# Patient Record
Sex: FEMALE | Race: BLACK OR AFRICAN AMERICAN | NOT HISPANIC OR LATINO | Employment: FULL TIME | URBAN - METROPOLITAN AREA
[De-identification: names, ages, dates, MRNs, and addresses within clinical notes are randomized per-mention and may not be internally consistent; named-entity substitution may affect disease eponyms.]

---

## 2023-06-30 ENCOUNTER — OFFICE VISIT (OUTPATIENT)
Dept: PRIMARY CARE | Facility: CLINIC | Age: 33
End: 2023-06-30
Payer: COMMERCIAL

## 2023-06-30 VITALS
BODY MASS INDEX: 29.41 KG/M2 | WEIGHT: 176.5 LBS | DIASTOLIC BLOOD PRESSURE: 66 MMHG | OXYGEN SATURATION: 99 % | HEIGHT: 65 IN | TEMPERATURE: 97.1 F | HEART RATE: 69 BPM | SYSTOLIC BLOOD PRESSURE: 96 MMHG

## 2023-06-30 DIAGNOSIS — L30.9 DERMATITIS: Primary | ICD-10-CM

## 2023-06-30 PROCEDURE — 1036F TOBACCO NON-USER: CPT | Performed by: STUDENT IN AN ORGANIZED HEALTH CARE EDUCATION/TRAINING PROGRAM

## 2023-06-30 PROCEDURE — 99213 OFFICE O/P EST LOW 20 MIN: CPT | Performed by: STUDENT IN AN ORGANIZED HEALTH CARE EDUCATION/TRAINING PROGRAM

## 2023-06-30 RX ORDER — PANTOPRAZOLE SODIUM 40 MG/1
TABLET, DELAYED RELEASE ORAL
COMMUNITY
Start: 2023-06-24

## 2023-06-30 RX ORDER — TRIAMCINOLONE ACETONIDE 1 MG/G
OINTMENT TOPICAL 2 TIMES DAILY PRN
Qty: 15 G | Refills: 0 | Status: SHIPPED | OUTPATIENT
Start: 2023-06-30 | End: 2023-10-28

## 2023-06-30 RX ORDER — ALBUTEROL SULFATE 90 UG/1
2 AEROSOL, METERED RESPIRATORY (INHALATION) EVERY 4 HOURS PRN
COMMUNITY
Start: 2015-02-11

## 2023-06-30 RX ORDER — TRIAMCINOLONE ACETONIDE 1 MG/G
OINTMENT TOPICAL 2 TIMES DAILY PRN
Qty: 15 G | Refills: 0 | Status: SHIPPED | OUTPATIENT
Start: 2023-06-30 | End: 2023-06-30 | Stop reason: SDUPTHER

## 2023-06-30 RX ORDER — HYDROXYZINE HYDROCHLORIDE 25 MG/1
1 TABLET, FILM COATED ORAL EVERY 6 HOURS PRN
COMMUNITY
Start: 2023-06-16 | End: 2023-07-16

## 2023-06-30 ASSESSMENT — PAIN SCALES - GENERAL: PAINLEVEL: 0-NO PAIN

## 2023-06-30 NOTE — PROGRESS NOTES
"Subjective   Patient ID: Idania Andersen is a 32 y.o. female who presents for [facility] (Stomach issues).    HPI     32-year-old female with past medical history of asthma and eczema who presents to the [facility].  Today, she is concerned about a rash on her neck region.  Patient states that the rash started about 3 weeks ago and she went to another hospital and was prescribed prednisone and hydroxyzine.  It is associated with pruritus, and erythema.  Patient reports having similar rash underneath her right breast which has resolved after taking some sort of antifungal cream.  Patient is followed by dermatology and uses a special compound cream (HYDROCORTISONE USP 2.5% / HYDROQUINONE USP 6% / TRETINOIN USP 0.05%.) for her eczema.  Patient reports doing some blood work around the time of her symptom onset.    Review of Systems    ROS:    - CONSTITUTIONAL: Denies weight loss, fever and chills.    - HEENT: Denies changes in vision and hearing.    - RESPIRATORY: Denies SOB and cough.    - CV: Denies palpitations and CP.    - GI: Denies abdominal pain, nausea, vomiting and diarrhea.    - : Denies dysuria and urinary frequency.    - MSK: Denies myalgia and joint pain.    - SKIN: Denies rash and pruritus.    - NEUROLOGICAL: Denies headache and syncope.    - PSYCHIATRIC: Denies recent changes in mood. Denies anxiety and depression.    A 12-point review of systems was completed and is otherwise negative except as noted in the HPI.      Objective   BP 96/66 (BP Location: Left arm, Patient Position: Sitting, BP Cuff Size: Adult long)   Pulse 69   Temp 36.2 °C (97.1 °F) (Temporal)   Ht 1.651 m (5' 5\")   Wt 80.1 kg (176 lb 8 oz)   SpO2 99%   BMI 29.37 kg/m²     Physical Exam    PHYSICAL EXAM:    - GENERAL: Alert and oriented x 3. No acute distress. Well-nourished.    - EYES: EOMI. Anicteric.    - HENT: Moist mucous membranes. No scleral icterus. No cervical lymphadenopathy.    - LUNGS: Clear to auscultation " bilaterally. No accessory muscle use.    - CARDIOVASCULAR: Regular rate and rhythm. No murmur. No JVD.    - ABDOMEN: Soft, non-tender and non-distended. No palpable masses.    - EXTREMITIES: No edema. Non-tender.    - SKIN: Erythematous slightly raised patch changes on the left neck region.  No excoriations or tenderness.    - NEUROLOGIC: No focal neurological deficits. CN II-XII grossly intact, but not individually tested.    - PSYCHIATRIC: Cooperative. Appropriate mood and affect.     Assessment/Plan       #Skin dermatitis, likely irritant vs eczematous   -Rx triamcinolone  -Skin hydration with emollients  -Skin hygiene with mild soap  -Continue antihistamines for pruritus  -Return to clinic for follow-up if symptoms worsens or does not improve.    Patient seen and discussed with attending physician Dr. Tod Croft MD  Family Medicine, PGY-2    This note was completed using Dragon voice recognition technology and may include unintended errors with respect to translation of words, typographical errors or grammar errors which may not have been identified while finalizing the chart.

## 2023-07-12 NOTE — PROGRESS NOTES
I saw and evaluated the patient. I personally obtained the key and critical portions of the history and physical exam or was physically present for key and critical portions performed by the resident/fellow. I reviewed the resident/fellow's documentation and discussed the patient with the resident/fellow. I agree with the resident/fellow's medical decision making as documented in the note.    Art Baron MD

## 2024-07-09 ENCOUNTER — APPOINTMENT (OUTPATIENT)
Dept: OBSTETRICS AND GYNECOLOGY | Facility: CLINIC | Age: 34
End: 2024-07-09
Payer: COMMERCIAL

## 2024-07-10 ENCOUNTER — OFFICE VISIT (OUTPATIENT)
Dept: OBSTETRICS AND GYNECOLOGY | Facility: CLINIC | Age: 34
End: 2024-07-10
Payer: COMMERCIAL

## 2024-07-10 VITALS
WEIGHT: 182 LBS | SYSTOLIC BLOOD PRESSURE: 118 MMHG | BODY MASS INDEX: 30.32 KG/M2 | HEIGHT: 65 IN | DIASTOLIC BLOOD PRESSURE: 68 MMHG

## 2024-07-10 DIAGNOSIS — N89.8 VAGINAL LESION: ICD-10-CM

## 2024-07-10 DIAGNOSIS — N89.8 VAGINAL IRRITATION: Primary | ICD-10-CM

## 2024-07-10 PROCEDURE — 87529 HSV DNA AMP PROBE: CPT

## 2024-07-10 PROCEDURE — 99213 OFFICE O/P EST LOW 20 MIN: CPT | Performed by: ADVANCED PRACTICE MIDWIFE

## 2024-07-10 RX ORDER — CHOLECALCIFEROL (VITAMIN D3) 50 MCG
TABLET ORAL
COMMUNITY

## 2024-07-10 RX ORDER — MAGNESIUM GLUCONATE 27 MG(500)
27 TABLET ORAL 2 TIMES DAILY
COMMUNITY

## 2024-07-10 RX ORDER — POLYDEXTROSE 1.5 G
1 TABLET,CHEWABLE ORAL
COMMUNITY

## 2024-07-10 NOTE — PROGRESS NOTES
"Subjective   Patient ID: Idania Andersen is a 33 y.o. female who presents for Vaginitis/Bacterial Vaginosis (Vaginal Irritation///Chaperone Declined: KENISHA Torres/).  HPI  Pt. Here for vaginal irritation  Mostly external   Felt like vulva was \"on fire\" yesterday     Onset over the weekend  Used a dial soap and believes that's what caused it     +itching  +burning   Denies any odor    Sexually active 2 weeks ago   On/off partner  No condom   Used Plan B   LMP 6/19     Denies urinary issues  Pain with wiping     Hx of trichomonas     Currently on Day 4 of Monistat    Review of Systems    Objective   Physical Exam  Constitutional:       Appearance: Normal appearance.   Genitourinary:     Vagina: Vaginal discharge present.          Comments: Small cut like appearance on vulva     Labia majora WNL  Some thick white discharge surrounding introitus, no odor appreciated      Neurological:      Mental Status: She is alert.         Assessment/Plan   Problem List Items Addressed This Visit             ICD-10-CM       Medium    Vaginal irritation - Primary N89.8     Discussed hygiene  Finish course of Monistat, unable to do vaginitis swab with medication in vagina.   Has upcoming appointment with Dr. Covington, will assess further if needed at that time.          Vaginal lesion N89.8     HSV swab obtained          Relevant Orders    HSV PCR, Skin/Mucosa            CLAUDIA Garcia 07/10/24 2:10 PM   "

## 2024-07-11 PROBLEM — N89.8 VAGINAL IRRITATION: Status: ACTIVE | Noted: 2024-07-11

## 2024-07-11 PROBLEM — N89.8 VAGINAL LESION: Status: ACTIVE | Noted: 2024-07-11

## 2024-07-11 LAB
HSV1 DNA SKIN QL NAA+PROBE: NOT DETECTED
HSV2 DNA SKIN QL NAA+PROBE: NOT DETECTED

## 2024-07-11 NOTE — ASSESSMENT & PLAN NOTE
Discussed hygiene  Finish course of Monistat, unable to do vaginitis swab with medication in vagina.   Has upcoming appointment with Dr. Covington, will assess further if needed at that time.

## 2024-07-14 ASSESSMENT — ENCOUNTER SYMPTOMS
CONSTIPATION: 1
NAUSEA: 0
DIARRHEA: 0
BACK PAIN: 0
FREQUENCY: 0
HEMATURIA: 0
CHILLS: 0
ABDOMINAL PAIN: 0
FEVER: 0
HEADACHES: 0
VOMITING: 0
SORE THROAT: 0
ANOREXIA: 0
DYSURIA: 0
FLANK PAIN: 0

## 2024-07-18 ENCOUNTER — APPOINTMENT (OUTPATIENT)
Dept: OBSTETRICS AND GYNECOLOGY | Facility: CLINIC | Age: 34
End: 2024-07-18
Payer: COMMERCIAL

## 2024-07-18 VITALS
SYSTOLIC BLOOD PRESSURE: 117 MMHG | BODY MASS INDEX: 30.69 KG/M2 | WEIGHT: 184.2 LBS | DIASTOLIC BLOOD PRESSURE: 74 MMHG | HEIGHT: 65 IN

## 2024-07-18 DIAGNOSIS — Z30.09 STERILIZATION CONSULT: ICD-10-CM

## 2024-07-18 DIAGNOSIS — Z33.1 PREGNANT STATE, INCIDENTAL (HHS-HCC): Primary | ICD-10-CM

## 2024-07-18 LAB — PREGNANCY TEST URINE, POC: POSITIVE

## 2024-07-18 PROCEDURE — 3008F BODY MASS INDEX DOCD: CPT | Performed by: STUDENT IN AN ORGANIZED HEALTH CARE EDUCATION/TRAINING PROGRAM

## 2024-07-18 PROCEDURE — 81025 URINE PREGNANCY TEST: CPT | Performed by: STUDENT IN AN ORGANIZED HEALTH CARE EDUCATION/TRAINING PROGRAM

## 2024-07-18 PROCEDURE — 99213 OFFICE O/P EST LOW 20 MIN: CPT | Performed by: STUDENT IN AN ORGANIZED HEALTH CARE EDUCATION/TRAINING PROGRAM

## 2024-07-18 ASSESSMENT — PATIENT HEALTH QUESTIONNAIRE - PHQ9
2. FEELING DOWN, DEPRESSED OR HOPELESS: NOT AT ALL
1. LITTLE INTEREST OR PLEASURE IN DOING THINGS: NOT AT ALL
SUM OF ALL RESPONSES TO PHQ9 QUESTIONS 1 AND 2: 0

## 2024-07-18 ASSESSMENT — PAIN SCALES - GENERAL: PAINLEVEL: 0-NO PAIN

## 2024-07-23 ENCOUNTER — HOSPITAL ENCOUNTER (EMERGENCY)
Facility: HOSPITAL | Age: 34
Discharge: HOME | End: 2024-07-24
Attending: EMERGENCY MEDICINE
Payer: COMMERCIAL

## 2024-07-23 DIAGNOSIS — O00.202: Primary | ICD-10-CM

## 2024-07-23 LAB
ALBUMIN SERPL BCP-MCNC: 4.3 G/DL (ref 3.4–5)
ALP SERPL-CCNC: 40 U/L (ref 33–110)
ALT SERPL W P-5'-P-CCNC: 11 U/L (ref 7–45)
ANION GAP SERPL CALC-SCNC: 12 MMOL/L (ref 10–20)
APPEARANCE UR: CLEAR
AST SERPL W P-5'-P-CCNC: 15 U/L (ref 9–39)
BACTERIA #/AREA URNS AUTO: ABNORMAL /HPF
BILIRUB SERPL-MCNC: 0.5 MG/DL (ref 0–1.2)
BILIRUB UR STRIP.AUTO-MCNC: NEGATIVE MG/DL
BUN SERPL-MCNC: 13 MG/DL (ref 6–23)
CALCIUM SERPL-MCNC: 9.2 MG/DL (ref 8.6–10.3)
CHLORIDE SERPL-SCNC: 103 MMOL/L (ref 98–107)
CO2 SERPL-SCNC: 23 MMOL/L (ref 21–32)
COLOR UR: COLORLESS
CREAT SERPL-MCNC: 0.78 MG/DL (ref 0.5–1.05)
EGFRCR SERPLBLD CKD-EPI 2021: >90 ML/MIN/1.73M*2
ERYTHROCYTE [DISTWIDTH] IN BLOOD BY AUTOMATED COUNT: 13.1 % (ref 11.5–14.5)
GLUCOSE SERPL-MCNC: 95 MG/DL (ref 74–99)
GLUCOSE UR STRIP.AUTO-MCNC: NORMAL MG/DL
HCG UR QL IA.RAPID: POSITIVE
HCT VFR BLD AUTO: 34.4 % (ref 36–46)
HGB BLD-MCNC: 11.7 G/DL (ref 12–16)
KETONES UR STRIP.AUTO-MCNC: NEGATIVE MG/DL
LEUKOCYTE ESTERASE UR QL STRIP.AUTO: NEGATIVE
MCH RBC QN AUTO: 30.2 PG (ref 26–34)
MCHC RBC AUTO-ENTMCNC: 34 G/DL (ref 32–36)
MCV RBC AUTO: 89 FL (ref 80–100)
NITRITE UR QL STRIP.AUTO: NEGATIVE
NRBC BLD-RTO: 0 /100 WBCS (ref 0–0)
PH UR STRIP.AUTO: 6 [PH]
PLATELET # BLD AUTO: 376 X10*3/UL (ref 150–450)
POTASSIUM SERPL-SCNC: 3.8 MMOL/L (ref 3.5–5.3)
PROT SERPL-MCNC: 6.8 G/DL (ref 6.4–8.2)
PROT UR STRIP.AUTO-MCNC: NEGATIVE MG/DL
RBC # BLD AUTO: 3.87 X10*6/UL (ref 4–5.2)
RBC # UR STRIP.AUTO: ABNORMAL /UL
RBC #/AREA URNS AUTO: ABNORMAL /HPF
SODIUM SERPL-SCNC: 134 MMOL/L (ref 136–145)
SP GR UR STRIP.AUTO: 1
SQUAMOUS #/AREA URNS AUTO: ABNORMAL /HPF
UROBILINOGEN UR STRIP.AUTO-MCNC: NORMAL MG/DL
WBC # BLD AUTO: 8 X10*3/UL (ref 4.4–11.3)
WBC #/AREA URNS AUTO: ABNORMAL /HPF

## 2024-07-23 PROCEDURE — 81025 URINE PREGNANCY TEST: CPT | Performed by: EMERGENCY MEDICINE

## 2024-07-23 PROCEDURE — 99284 EMERGENCY DEPT VISIT MOD MDM: CPT | Mod: 25

## 2024-07-23 PROCEDURE — 85027 COMPLETE CBC AUTOMATED: CPT | Performed by: EMERGENCY MEDICINE

## 2024-07-23 PROCEDURE — 81001 URINALYSIS AUTO W/SCOPE: CPT | Performed by: EMERGENCY MEDICINE

## 2024-07-23 PROCEDURE — 80053 COMPREHEN METABOLIC PANEL: CPT | Performed by: EMERGENCY MEDICINE

## 2024-07-23 PROCEDURE — 84702 CHORIONIC GONADOTROPIN TEST: CPT | Performed by: EMERGENCY MEDICINE

## 2024-07-23 PROCEDURE — 36415 COLL VENOUS BLD VENIPUNCTURE: CPT | Performed by: EMERGENCY MEDICINE

## 2024-07-23 RX ORDER — ACETAMINOPHEN 325 MG/1
975 TABLET ORAL ONCE
Status: COMPLETED | OUTPATIENT
Start: 2024-07-23 | End: 2024-07-23

## 2024-07-23 ASSESSMENT — COLUMBIA-SUICIDE SEVERITY RATING SCALE - C-SSRS
1. IN THE PAST MONTH, HAVE YOU WISHED YOU WERE DEAD OR WISHED YOU COULD GO TO SLEEP AND NOT WAKE UP?: NO
2. HAVE YOU ACTUALLY HAD ANY THOUGHTS OF KILLING YOURSELF?: NO
6. HAVE YOU EVER DONE ANYTHING, STARTED TO DO ANYTHING, OR PREPARED TO DO ANYTHING TO END YOUR LIFE?: NO

## 2024-07-23 ASSESSMENT — PAIN - FUNCTIONAL ASSESSMENT: PAIN_FUNCTIONAL_ASSESSMENT: 0-10

## 2024-07-23 ASSESSMENT — PAIN SCALES - GENERAL
PAINLEVEL_OUTOF10: 10 - WORST POSSIBLE PAIN
PAINLEVEL_OUTOF10: 8

## 2024-07-24 ENCOUNTER — LAB (OUTPATIENT)
Dept: LAB | Facility: LAB | Age: 34
End: 2024-07-24
Payer: COMMERCIAL

## 2024-07-24 ENCOUNTER — APPOINTMENT (OUTPATIENT)
Dept: RADIOLOGY | Facility: HOSPITAL | Age: 34
End: 2024-07-24
Payer: COMMERCIAL

## 2024-07-24 ENCOUNTER — PREP FOR PROCEDURE (OUTPATIENT)
Dept: OBSTETRICS AND GYNECOLOGY | Facility: CLINIC | Age: 34
End: 2024-07-24

## 2024-07-24 VITALS
HEART RATE: 72 BPM | DIASTOLIC BLOOD PRESSURE: 47 MMHG | WEIGHT: 183 LBS | OXYGEN SATURATION: 98 % | BODY MASS INDEX: 31.24 KG/M2 | HEIGHT: 64 IN | SYSTOLIC BLOOD PRESSURE: 137 MMHG | RESPIRATION RATE: 16 BRPM | TEMPERATURE: 99 F

## 2024-07-24 DIAGNOSIS — O36.80X0 PREGNANCY OF UNKNOWN ANATOMIC LOCATION (HHS-HCC): Primary | ICD-10-CM

## 2024-07-24 DIAGNOSIS — O00.202: ICD-10-CM

## 2024-07-24 DIAGNOSIS — O36.80X0 PREGNANCY OF UNKNOWN ANATOMIC LOCATION (HHS-HCC): ICD-10-CM

## 2024-07-24 LAB
B-HCG SERPL-ACNC: 2705 MIU/ML
B-HCG SERPL-ACNC: 3085 MIU/ML
PROGEST SERPL-MCNC: 10.6 NG/ML

## 2024-07-24 PROCEDURE — 36415 COLL VENOUS BLD VENIPUNCTURE: CPT

## 2024-07-24 PROCEDURE — 96361 HYDRATE IV INFUSION ADD-ON: CPT

## 2024-07-24 PROCEDURE — 84702 CHORIONIC GONADOTROPIN TEST: CPT

## 2024-07-24 PROCEDURE — 2500000004 HC RX 250 GENERAL PHARMACY W/ HCPCS (ALT 636 FOR OP/ED): Performed by: EMERGENCY MEDICINE

## 2024-07-24 PROCEDURE — 99222 1ST HOSP IP/OBS MODERATE 55: CPT | Performed by: STUDENT IN AN ORGANIZED HEALTH CARE EDUCATION/TRAINING PROGRAM

## 2024-07-24 PROCEDURE — 76801 OB US < 14 WKS SINGLE FETUS: CPT

## 2024-07-24 PROCEDURE — 84144 ASSAY OF PROGESTERONE: CPT

## 2024-07-24 PROCEDURE — 96360 HYDRATION IV INFUSION INIT: CPT

## 2024-07-24 RX ORDER — GABAPENTIN 600 MG/1
600 TABLET ORAL ONCE
OUTPATIENT
Start: 2024-07-24 | End: 2024-07-24

## 2024-07-24 RX ORDER — ACETAMINOPHEN 325 MG/1
975 TABLET ORAL ONCE
OUTPATIENT
Start: 2024-07-24 | End: 2024-07-24

## 2024-07-24 RX ORDER — CELECOXIB 400 MG/1
400 CAPSULE ORAL ONCE
OUTPATIENT
Start: 2024-07-24 | End: 2024-07-24

## 2024-07-24 ASSESSMENT — PAIN SCALES - GENERAL: PAINLEVEL_OUTOF10: 0 - NO PAIN

## 2024-07-24 NOTE — ED PROVIDER NOTES
HPI   Chief Complaint   Patient presents with    Abdominal Pain    Vaginal Bleeding       HPI  Patient is G4,  at approximately 5 weeks by her LMP.  This is not a desired pregnancy.  The patient did want to take Plan B without success.  Patient states that she is having increasing left-sided abdominal pain in the lower quadrant.  Denies any vomiting with this.  States that she started having some vaginal spotting.  She is not having signs of bleeding.      Patient History   Past Medical History:   Diagnosis Date    Acute tonsillitis, unspecified 2014    Acute tonsillitis    Acute vaginitis 2015    BV (bacterial vaginosis)    Constipation, unspecified 2015    Acute constipation    Encounter for screening for malignant neoplasm of cervix 2018    Cervical cancer screening    Personal history of other diseases of the female genital tract 10/15/2015    History of vaginal discharge    Personal history of other diseases of the female genital tract 10/15/2015    History of vaginitis    Personal history of other diseases of the nervous system and sense organs 10/10/2014    History of acute otitis media    Personal history of other diseases of the respiratory system 2018    History of acute sinusitis    Personal history of other infectious and parasitic diseases 2015    History of candidiasis of vagina    Personal history of other specified conditions 2018    History of dysuria    Personal history of other specified conditions 2014    History of urinary frequency    Personal history of urinary (tract) infections 2018    History of urinary tract infection    Pregnant state, incidental ([facility]) 10/15/2015    Pregnancy, incidental    Unspecified asthma, uncomplicated ([facility]) 2017    Asthma     No past surgical history on file.  No family history on file.  Social History     Tobacco Use    Smoking status: Never     Passive exposure: Never    Smokeless tobacco:  Never   Vaping Use    Vaping status: Never Used   Substance Use Topics    Alcohol use: Never    Drug use: Never       Physical Exam   ED Triage Vitals   Temperature Heart Rate Respirations BP   07/23/24 2130 07/23/24 2130 07/23/24 2130 07/23/24 2130   37.2 °C (99 °F) 78 16 125/65      Pulse Ox Temp src Heart Rate Source Patient Position   07/23/24 2130 -- 07/23/24 2330 07/23/24 2330   100 %  Monitor Sitting      BP Location FiO2 (%)     07/23/24 2330 --     Right arm        Physical Exam  Vitals and nursing note reviewed.   Constitutional:       General: She is not in acute distress.     Appearance: She is well-developed.   HENT:      Head: Normocephalic and atraumatic.   Eyes:      Conjunctiva/sclera: Conjunctivae normal.   Cardiovascular:      Rate and Rhythm: Normal rate and regular rhythm.      Heart sounds: No murmur heard.  Pulmonary:      Effort: Pulmonary effort is normal. No respiratory distress.      Breath sounds: Normal breath sounds.   Abdominal:      Palpations: Abdomen is soft.      Tenderness: There is no abdominal tenderness.   Musculoskeletal:         General: No swelling.      Cervical back: Neck supple.   Skin:     General: Skin is warm and dry.      Capillary Refill: Capillary refill takes less than 2 seconds.   Neurological:      Mental Status: She is alert.   Psychiatric:         Mood and Affect: Mood normal.           ED Course & MDM   Diagnoses as of 07/24/24 0516   Left ovarian pregnancy without intrauterine pregnancy ([facility])                       Reyes Coma Scale Score: 15                        Medical Decision Making  The patient had an ultrasound performed which does not show any obvious IUP.  The patient's hCG at 2700 is below the level discrimination for intrauterine pregnancy.  However the patient is not interested in pregnancy at this time.  Spoke to gynecology for just a D&C and if negative for intrauterine pregnancy can consider treatment for an ectopic pregnancy at that time  either medications or further surgery.  Likely discharge from the PACU but otherwise would need to be admitted to medicine.    Procedure  Procedures     Rodrigo Ospina MD  07/24/24 0588

## 2024-07-24 NOTE — CONSULTS
Inpatient consult to Gynecology  Consult performed by: Neptali Covington MD  Consult ordered by: Rodrigo Ospina MD  Reason for consult: Suspected ectopic pregnancy  Assessment/Recommendations: History labs and imaging reviewed in detail.  Patient counseled at the bedside.  Ultrasound is suspicious for but not diagnostic of ectopic pregnancy.  There is no evidence of rupture on ultrasound.  Patient does not accept the risks of disrupting a potentially viable intrauterine pregnancy if present.  Patient is stable from a hemodynamic perspective and has a benign abdominal exam.  Using shared decision making plan is to trend beta-hCG levels and repeat ultrasound on Friday.  Patient will go to the lab this morning for an hCG level and then another hCG level Friday morning so that she does not have to return to the ED late Thursday night.  Strict return precautions were reviewed with the patient.  I will request that my office schedule follow-up for her on Friday afternoon.          Reason For Consult  Suspected ectopic pregnancy    History Of Present Illness  Idania Andersen is a 33 y.o. female presenting with pelvic pain and bleeding in the setting of a positive pregnancy test.  Workup in the ED showed an elevated beta-hCG with normal hemoglobin, white blood count, transaminases and creatinine level.  Ultrasound did not visualize an intrauterine pregnancy but did show an adnexal mass that is suspicious for but not diagnostic of ectopic pregnancy.  There is no free fluid in the pelvis gynecology was consulted.     Past Medical History  She has a past medical history of Acute tonsillitis, unspecified (05/20/2014), Acute vaginitis (05/27/2015), Constipation, unspecified (02/18/2015), Encounter for screening for malignant neoplasm of cervix (04/13/2018), Personal history of other diseases of the female genital tract (10/15/2015), Personal history of other diseases of the female genital tract (10/15/2015), Personal  "history of other diseases of the nervous system and sense organs (10/10/2014), Personal history of other diseases of the respiratory system (09/26/2018), Personal history of other infectious and parasitic diseases (06/26/2015), Personal history of other specified conditions (07/12/2018), Personal history of other specified conditions (05/20/2014), Personal history of urinary (tract) infections (07/12/2018), Pregnant state, incidental ([facility]) (10/15/2015), and Unspecified asthma, uncomplicated ([facility]) (09/11/2017).    Surgical History  She has no past surgical history on file.     Social History  She reports that she has never smoked. She has never been exposed to tobacco smoke. She has never used smokeless tobacco. She reports that she does not drink alcohol and does not use drugs.    Family History  No family history on file.     Allergies  Apricot, Cherry, Kiwi, Peach, and Plum    Review of Systems   Genitourinary:  Positive for pelvic pain and vaginal bleeding.        Physical Exam  Vitals reviewed.   Constitutional:       General: She is not in acute distress.     Appearance: She is not ill-appearing.   Pulmonary:      Effort: Pulmonary effort is normal.   Abdominal:      General: There is no distension.      Palpations: Abdomen is soft.      Tenderness: There is no abdominal tenderness. There is no guarding or rebound.   Skin:     Coloration: Skin is not pale.   Neurological:      Mental Status: She is alert.          Last Recorded Vitals  Blood pressure 134/69, pulse 74, temperature 37.2 °C (99 °F), resp. rate 16, height 1.626 m (5' 4\"), weight 83 kg (183 lb), last menstrual period 06/19/2024, SpO2 100%.    Relevant Results    US PELVIS OB TRANSABDOMINAL W TRANSVAGINAL UP TO 1ST TRIMESTER    Result Date: 7/24/2024  Interpreted By:  Ezekiel Hernandez, STUDY: US PELVIS OB TRANSABDOMINAL W TRANSVAGINAL UP TO 1ST TRIMESTER; ; 7/24/2024 1:08 am   INDICATION: Signs/Symptoms:r/o ectopic 5 wks unlikely to see IUP.  " Quantitative beta HCG value of 2,705.   COMPARISON: None.   ACCESSION NUMBER(S): [id number]   ORDERING CLINICIAN: ESTER CASTRO   TECHNIQUE: Grayscale, color and spectral Doppler ultrasound evaluation of the pelvis was performed transabdominally and transvaginally.   FINDINGS: UTERUS: Normal, measuring 9.4 x 4.9 x 5.2 cm. ENDOMETRIUM: The endometrium measures 10 mm in thickness. There is a small volume of fluid in the endometrial canal. No gestational sac or sac-like structure is present in the endometrial canal. RIGHT OVARY: Normal, measuring 2.6 x 1.9 x 1.8 cm, incidentally noting a corpus luteum. Normal arterial and venous flow present. LEFT OVARY: Left ovary appears normal and measures 2.4 x 1.7 x 1.7 cm. Normal arterial and venous flow present.There is an extra-ovarian cystic mass with peripheral hyperechoic rim in the left adnexa , with an internal thin-walled cystic structure suggesting a yolk sac, overall suggestive of an ectopic gestational sac. The mass in total measures 16 x 9 x 10 mm. CUL DE SAC: No free-fluid.       Cystic mass extra-ovarian left adnexal mass with suggestion of a yolk sac within it, overall suggestive of a left adnexal ectopic pregnancy. No significant pelvic fluid is seen to suggest rupture. Gynecology/obstetric consultation recommended.     MACRO: None   Signed by: Ezekiel Hernandez 7/24/2024 1:31 AM Dictation workstation:   [id number]            Assessment/Plan     History labs and imaging reviewed in detail.  Patient counseled at the bedside.  Ultrasound is suspicious for but not diagnostic of ectopic pregnancy.  There is no evidence of rupture on ultrasound.  Patient initially desired dilation and curettage with rapid frozen evaluation to determine whether or not this was an ectopic pregnancy and if ectopic methotrexate therapy.  However upon further counseling patient does not accept the risks of disrupting a potentially viable intrauterine pregnancy if present.  Patient is stable  from a hemodynamic perspective and has a benign abdominal exam.  Diagnosis remains pregnancy of unknown location cannot exclude ectopic pregnancy.  Using shared decision making plan is to trend beta-hCG levels and repeat ultrasound on Friday.  Patient will go to the lab this morning for an hCG level and then another hCG level Friday morning so that she does not have to return to the ED late Thursday night.  Strict return precautions were reviewed with the patient.  I will request that my office schedule follow-up for her on Friday afternoon.    I spent 60 minutes in the professional and overall care of this patient.

## 2024-07-24 NOTE — ED TRIAGE NOTES
Pt arrives to triage with c/o left sided abdominal cramping beginning yesterday and spotting beginning today. Pt is 5 weeks pregnant. The spotting began tinged and bright red and is gradually getting darker. Reports nausea, denies CP/SOB.

## 2024-07-24 NOTE — ED NOTES
The pt is 5 weeks pregnant and is cramping on there left side and bleeding. Her pain is radiating from the front to the back     Roverto Miguel RN  07/23/24 2474

## 2024-07-25 ENCOUNTER — HOSPITAL ENCOUNTER (OUTPATIENT)
Facility: HOSPITAL | Age: 34
Setting detail: OUTPATIENT SURGERY
End: 2024-07-25
Attending: STUDENT IN AN ORGANIZED HEALTH CARE EDUCATION/TRAINING PROGRAM | Admitting: STUDENT IN AN ORGANIZED HEALTH CARE EDUCATION/TRAINING PROGRAM
Payer: COMMERCIAL

## 2024-07-26 ENCOUNTER — APPOINTMENT (OUTPATIENT)
Dept: OBSTETRICS AND GYNECOLOGY | Facility: CLINIC | Age: 34
End: 2024-07-26
Payer: COMMERCIAL

## 2024-07-26 ENCOUNTER — HOSPITAL ENCOUNTER (OUTPATIENT)
Dept: RADIOLOGY | Facility: CLINIC | Age: 34
Discharge: HOME | End: 2024-07-26
Payer: COMMERCIAL

## 2024-07-26 ENCOUNTER — DOCUMENTATION (OUTPATIENT)
Dept: OBSTETRICS AND GYNECOLOGY | Facility: CLINIC | Age: 34
End: 2024-07-26

## 2024-07-26 ENCOUNTER — HOSPITAL ENCOUNTER (EMERGENCY)
Facility: HOSPITAL | Age: 34
Discharge: HOME | End: 2024-07-26
Attending: EMERGENCY MEDICINE
Payer: COMMERCIAL

## 2024-07-26 VITALS
OXYGEN SATURATION: 99 % | BODY MASS INDEX: 29.99 KG/M2 | DIASTOLIC BLOOD PRESSURE: 79 MMHG | HEART RATE: 70 BPM | SYSTOLIC BLOOD PRESSURE: 118 MMHG | WEIGHT: 180 LBS | HEIGHT: 65 IN | TEMPERATURE: 96.1 F | RESPIRATION RATE: 16 BRPM

## 2024-07-26 DIAGNOSIS — O00.102 LEFT TUBAL PREGNANCY WITHOUT INTRAUTERINE PREGNANCY (HHS-HCC): Primary | ICD-10-CM

## 2024-07-26 DIAGNOSIS — O36.80X0 PREGNANCY OF UNKNOWN ANATOMIC LOCATION (HHS-HCC): ICD-10-CM

## 2024-07-26 LAB
ABO GROUP (TYPE) IN BLOOD: NORMAL
ALBUMIN SERPL BCP-MCNC: 4.3 G/DL (ref 3.4–5)
ALP SERPL-CCNC: 36 U/L (ref 33–110)
ALT SERPL W P-5'-P-CCNC: 10 U/L (ref 7–45)
ANION GAP SERPL CALC-SCNC: 14 MMOL/L (ref 10–20)
ANTIBODY SCREEN: NORMAL
AST SERPL W P-5'-P-CCNC: 14 U/L (ref 9–39)
B-HCG SERPL-ACNC: 5669 MIU/ML
BASOPHILS # BLD AUTO: 0.05 X10*3/UL (ref 0–0.1)
BASOPHILS NFR BLD AUTO: 0.8 %
BILIRUB SERPL-MCNC: 0.5 MG/DL (ref 0–1.2)
BUN SERPL-MCNC: 9 MG/DL (ref 6–23)
CALCIUM SERPL-MCNC: 9.3 MG/DL (ref 8.6–10.6)
CHLORIDE SERPL-SCNC: 104 MMOL/L (ref 98–107)
CO2 SERPL-SCNC: 21 MMOL/L (ref 21–32)
CREAT SERPL-MCNC: 0.75 MG/DL (ref 0.5–1.05)
EGFRCR SERPLBLD CKD-EPI 2021: >90 ML/MIN/1.73M*2
EOSINOPHIL # BLD AUTO: 0.17 X10*3/UL (ref 0–0.7)
EOSINOPHIL NFR BLD AUTO: 2.6 %
ERYTHROCYTE [DISTWIDTH] IN BLOOD BY AUTOMATED COUNT: 12.6 % (ref 11.5–14.5)
GLUCOSE SERPL-MCNC: 85 MG/DL (ref 74–99)
HCT VFR BLD AUTO: 30.2 % (ref 36–46)
HGB BLD-MCNC: 10.6 G/DL (ref 12–16)
HOLD SPECIMEN: NORMAL
IMM GRANULOCYTES # BLD AUTO: 0.02 X10*3/UL (ref 0–0.7)
IMM GRANULOCYTES NFR BLD AUTO: 0.3 % (ref 0–0.9)
LYMPHOCYTES # BLD AUTO: 2.22 X10*3/UL (ref 1.2–4.8)
LYMPHOCYTES NFR BLD AUTO: 34.4 %
MCH RBC QN AUTO: 29.4 PG (ref 26–34)
MCHC RBC AUTO-ENTMCNC: 35.1 G/DL (ref 32–36)
MCV RBC AUTO: 84 FL (ref 80–100)
MONOCYTES # BLD AUTO: 0.6 X10*3/UL (ref 0.1–1)
MONOCYTES NFR BLD AUTO: 9.3 %
NEUTROPHILS # BLD AUTO: 3.4 X10*3/UL (ref 1.2–7.7)
NEUTROPHILS NFR BLD AUTO: 52.6 %
NRBC BLD-RTO: 0 /100 WBCS (ref 0–0)
PLATELET # BLD AUTO: 332 X10*3/UL (ref 150–450)
POTASSIUM SERPL-SCNC: 3.5 MMOL/L (ref 3.5–5.3)
PROT SERPL-MCNC: 7.4 G/DL (ref 6.4–8.2)
RBC # BLD AUTO: 3.6 X10*6/UL (ref 4–5.2)
RH FACTOR (ANTIGEN D): NORMAL
SODIUM SERPL-SCNC: 135 MMOL/L (ref 136–145)
WBC # BLD AUTO: 6.5 X10*3/UL (ref 4.4–11.3)

## 2024-07-26 PROCEDURE — 99284 EMERGENCY DEPT VISIT MOD MDM: CPT

## 2024-07-26 PROCEDURE — 76801 OB US < 14 WKS SINGLE FETUS: CPT

## 2024-07-26 PROCEDURE — 84702 CHORIONIC GONADOTROPIN TEST: CPT

## 2024-07-26 PROCEDURE — 36415 COLL VENOUS BLD VENIPUNCTURE: CPT

## 2024-07-26 PROCEDURE — 99213 OFFICE O/P EST LOW 20 MIN: CPT

## 2024-07-26 PROCEDURE — 80053 COMPREHEN METABOLIC PANEL: CPT

## 2024-07-26 PROCEDURE — 85025 COMPLETE CBC W/AUTO DIFF WBC: CPT

## 2024-07-26 PROCEDURE — 86901 BLOOD TYPING SEROLOGIC RH(D): CPT

## 2024-07-26 RX ORDER — METHOTREXATE 25 MG/ML
50 INJECTION INTRA-ARTERIAL; INTRAMUSCULAR; INTRATHECAL; INTRAVENOUS ONCE
Status: DISCONTINUED | OUTPATIENT
Start: 2024-07-26 | End: 2024-07-26 | Stop reason: HOSPADM

## 2024-07-26 ASSESSMENT — PAIN DESCRIPTION - PROGRESSION: CLINICAL_PROGRESSION: OTHER (COMMENT)

## 2024-07-26 ASSESSMENT — PAIN SCALES - GENERAL: PAINLEVEL_OUTOF10: 0 - NO PAIN

## 2024-07-26 ASSESSMENT — PAIN - FUNCTIONAL ASSESSMENT: PAIN_FUNCTIONAL_ASSESSMENT: 0-10

## 2024-07-26 NOTE — PROGRESS NOTES
Called patient to review ultrasound finding of left adnexal ectopic pregnancy.  Ultrasound imaging was reviewed with radiology showing 9 mm gestational sac with yolk sac.    Patient was counseled about this finding and need for treatment.  Options for treatment include surgical excision versus conservative therapy with methotrexate.  The risks and benefits of both were reviewed.  Patient prefers methotrexate therapy.    Typically reviewed that patient will require close follow-up methotrexate day 4 and day 7 with the initial date that she receives methotrexate being day 1.  Counseled that she may need additional doses of methotrexate or surgery depending on the hCG trend.  Counseled patient that methotrexate therapy in the setting does carry the risk of ruptured ectopic pregnancy and if she develops severe abdominal pain or symptoms of anemia she should present to the emergency room.  Patient expressed understanding and desires to proceed with methotrexate therapy.    Provide [facility] GYN attending Dr. Suma Roman that this patient will be coming to the [facility] ED.  The ED was notified as well.

## 2024-07-26 NOTE — ED PROVIDER NOTES
History of Present Illness     History provided by: Patient  Limitations to History: None  External Records Reviewed with Brief Summary: Reviewed the progress note from earlier today with the patient was evaluated by OB/GYN and was concern for left adnexal ectopic pregnancy.    HPI:  Idania Andersen is a 34 y.o. female with a past medical history of asthma coming into the emergency department today as a referral from her OB/GYN with concern for left-sided ectopic pregnancy.  The patient states that she had unprotected sex at the end of June, took some Plan B 2 days after and realized that she was pregnant approximately a week and a half ago.  She was seen at the emergency department at [facility] for vaginal bleeding was found to be pregnant, this was an unplanned pregnancy.  She states that since the vaginal bleeding she experienced then she has had some spotting however the bleeding has significantly decreased.  She saw her OB/GYN today and they told her there is concern for a left adnexal ectopic pregnancy.  Patient denies any nausea, vomiting, urinary symptoms, headache, dizziness or fever or chills.  States she otherwise feels well.  Denies any abdominal pain.    Physical Exam   Triage vitals:  T 36.8 °C (98.2 °F)  HR 75  /79  RR 16  O2 99 % None (Room air)    General: Awake, alert, in no acute distress  Eyes: Gaze conjugate.  No scleral icterus or injection  HENT: Normo-cephalic, atraumatic. No stridor  CV: Regular rate, regular rhythm. Radial pulses 2+ bilaterally  Resp: Breathing non-labored, speaking in full sentences.  Clear to auscultation bilaterally  GI: Soft, non-distended, non-tender. No rebound or guarding.  MSK/Extremities: No gross bony deformities. Moving all extremities  Skin: Warm. Appropriate color  Neuro: Alert. Oriented. Face symmetric. Speech is fluent.  Gross strength and sensation intact in b/l UE and LEs  Psych: Appropriate mood and affect    Medical Decision Making & ED Course    Medical Decision Makin y.o. female with the above past medical history presenting to the emergency department for methotrexate and a known ectopic pregnancy.  I spoke to the OB/GYN service and appropriate labs were ordered for the patient in addition to methotrexate.  They came down and evaluated the patient prior to methotrexate rudiing spoke to the patient about her risk factors as she is high risk and the patient still wanted to proceed with methotrexate administration.  Methotrexate was administered by the OB/GYN providers.  The patient was evaluated and monitored in the emergency department for approximately 30 minutes after administration, was noting some pain at the site of injection in the buttocks however was otherwise feeling well.  She was provided with return precautions in addition to follow-up instructions by OB/GYN and these instructions were reiterated by myself.  She is aware of the procedure that involved her going to the lab and additional time to continue to monitor and trend the beta-hCG she is also aware that she may require second dose of methotrexate or have to go to surgery for definitive management and she is agreeable with this.  She was provided with strict return precautions to the emergency department, in addition to being provided with instructions on pain management with Tylenol.  Patient is agreeable with the plan and would like to be discharged at this time.  The patient was discharged home in stable condition.  ----    Differential diagnoses considered include but are not limited to: ectopic pregnancy     Social Determinants of Health which Significantly Impact Care: None identified     EKG Independent Interpretation: See ED Course    Independent Result Review and Interpretation: See ED course    Chronic conditions affecting the patient's care: See HPI    The patient was discussed with the following consultants/services:  Obgyn who came down and evaluated the patient,  administered the methotrexate and provided her with follow up and return precautions.    Care Considerations: See MDM    ED Course:  ED Course as of 07/27/24 0302   Sat Jul 27, 2024   0300 Rh Type: POS  No indication for RhoGAM [KD]   0300 HCG, Beta-Quantitative(!): 5,669 [KD]   0300 HEMOGLOBIN(!): 10.6 [KD]      ED Course User Index  [KD] Lea Yates DO         Diagnoses as of 07/27/24 0302   Left tubal pregnancy without intrauterine pregnancy ([facility])     Disposition   As a result of the work-up, the patient was discharged home.  she was informed of her diagnosis and instructed to come back with any concerns or worsening of condition.  she and was agreeable to the plan as discussed above.  she was given the opportunity to ask questions.  All of the patient's questions were answered.    Seen and discussed with ED Attending  Lea Yates DO, PGY-2  Emergency Medicine     Lea Yates DO  Resident  07/27/24 0301       Lea Yates DO  Resident  07/27/24 0302

## 2024-07-26 NOTE — CONSULTS
Consults    Reason For Consult  IP GYN HPI/REASON FOR CONSULT: Ectopic Pregnancy    History Of Present Illness  Idania Andersen is a 34 y.o. female presenting with IP GYN HPI/REASON FOR CONSULT: Ectopic Pregnancy.     Patient seen in OSH ED on  for left sided abdominal pain. Workup included beta-hcg level [id number] and TVUS demonstrating pregnancy of unknown location. Repeat ultrasound on  showed left adnexal ectopic pregnancy with small amount of free fluid. Provider at OSH discussed treatment options with surgical vs medication management. Patient opted for methotrexate therapy and was transferred here for treatment.   CBC, CMP, Hcg results were evaluated. Hc.          Past Medical History  She has a past medical history of Acute tonsillitis, unspecified (2014), Acute vaginitis (2015), Constipation, unspecified (2015), Encounter for screening for malignant neoplasm of cervix (2018), Personal history of other diseases of the female genital tract (10/15/2015), Personal history of other diseases of the female genital tract (10/15/2015), Personal history of other diseases of the nervous system and sense organs (10/10/2014), Personal history of other diseases of the respiratory system (2018), Personal history of other infectious and parasitic diseases (2015), Personal history of other specified conditions (2018), Personal history of other specified conditions (2014), Personal history of urinary (tract) infections (2018), Pregnant state, incidental ([facility]) (10/15/2015), and Unspecified asthma, uncomplicated ([facility]) (2017).    Surgical History  She has no past surgical history on file.    OB History  OB History    Para Term  AB Living   4 2 2     2   SAB IAB Ectopic Multiple Live Births           2      # Outcome Date GA Lbr [place]/2nd Weight Sex Type Anes PTL Lv   4 Term 16   3.884 kg M Vag-Spont EPI  SOTERO   3 Term 09 40w0d  "  M Vag-Spont EPI  SOTERO   2             1             AB x1     Sexual History  Social History     Substance and Sexual Activity   Sexual Activity Yes    Partners: Male    Birth control/protection: None        Social History  She reports that she has never smoked. She has never been exposed to tobacco smoke. She has never used smokeless tobacco. She reports that she does not drink alcohol and does not use drugs.    Family History  No family history on file.     Allergies  Apricot, Cherry, Kiwi, Peach, and Plum    Review of Systems     Physical Exam     Last Recorded Vitals  Blood pressure 136/79, pulse 75, resp. rate 16, height 1.645 m (5' 4.75\"), weight 81.6 kg (180 lb), last menstrual period 2024.    Relevant Results  No current facility-administered medications for this encounter.    Current Outpatient Medications:     albuterol 90 mcg/actuation inhaler, Inhale 2 puffs every 4 hours if needed., Disp: , Rfl:     cholecalciferol (Vitamin D-3) 50 MCG (2000 UT) tablet, Take by mouth., Disp: , Rfl:     magnesium, as gluconate, (Magonate) 27 mg magnesium (500 mg) tablet, Take 1 tablet (27 mg) by mouth 2 times a day., Disp: , Rfl:     multivitamin with minerals (Hair,Skin and Nails) tablet, Take 1 tablet by mouth once daily., Disp: , Rfl:     pantoprazole (ProtoNix) 40 mg EC tablet, , Disp: , Rfl:     Results for orders placed or performed during the hospital encounter of 24 (from the past 24 hour(s))   CBC and Auto Differential   Result Value Ref Range    WBC 6.5 4.4 - 11.3 x10*3/uL    nRBC 0.0 0.0 - 0.0 /100 WBCs    RBC 3.60 (L) 4.00 - 5.20 x10*6/uL    Hemoglobin 10.6 (L) 12.0 - 16.0 g/dL    Hematocrit 30.2 (L) 36.0 - 46.0 %    MCV 84 80 - 100 fL    MCH 29.4 26.0 - 34.0 pg    MCHC 35.1 32.0 - 36.0 g/dL    RDW 12.6 11.5 - 14.5 %    Platelets 332 150 - 450 x10*3/uL    Neutrophils % 52.6 40.0 - 80.0 %    Immature Granulocytes %, Automated 0.3 0.0 - 0.9 %    Lymphocytes % 34.4 13.0 - 44.0 %    " Monocytes % 9.3 2.0 - 10.0 %    Eosinophils % 2.6 0.0 - 6.0 %    Basophils % 0.8 0.0 - 2.0 %    Neutrophils Absolute 3.40 1.20 - 7.70 x10*3/uL    Immature Granulocytes Absolute, Automated 0.02 0.00 - 0.70 x10*3/uL    Lymphocytes Absolute 2.22 1.20 - 4.80 x10*3/uL    Monocytes Absolute 0.60 0.10 - 1.00 x10*3/uL    Eosinophils Absolute 0.17 0.00 - 0.70 x10*3/uL    Basophils Absolute 0.05 0.00 - 0.10 x10*3/uL   Comprehensive metabolic panel   Result Value Ref Range    Glucose 85 74 - 99 mg/dL    Sodium 135 (L) 136 - 145 mmol/L    Potassium 3.5 3.5 - 5.3 mmol/L    Chloride 104 98 - 107 mmol/L    Bicarbonate 21 21 - 32 mmol/L    Anion Gap 14 10 - 20 mmol/L    Urea Nitrogen 9 6 - 23 mg/dL    Creatinine 0.75 0.50 - 1.05 mg/dL    eGFR >90 >60 mL/min/1.73m*2    Calcium 9.3 8.6 - 10.6 mg/dL    Albumin 4.3 3.4 - 5.0 g/dL    Alkaline Phosphatase 36 33 - 110 U/L    Total Protein 7.4 6.4 - 8.2 g/dL    AST 14 9 - 39 U/L    Bilirubin, Total 0.5 0.0 - 1.2 mg/dL    ALT 10 7 - 45 U/L   hCG, quantitative, pregnancy   Result Value Ref Range    HCG, Beta-Quantitative 5,669 (H) <5 mIU/mL   Light Blue Top   Result Value Ref Range    Extra Tube Hold for add-ons.    SST TOP   Result Value Ref Range    Extra Tube Hold for add-ons.    PST Top   Result Value Ref Range    Extra Tube Hold for add-ons.        US PELVIS OB TRANSABDOMINAL W TRANSVAGINAL UP TO 1ST TRIMESTER    Result Date: 7/26/2024  Interpreted By:  Almita Novoa, STUDY: US PELVIS OB TRANSABDOMINAL W TRANSVAGINAL UP TO 1ST TRIMESTER; ; 7/26/2024 1:31 pm   INDICATION: Signs/Symptoms:pregancy of unknown location.   COMPARISON: 07/24/2024   ACCESSION NUMBER(S): [id number]   ORDERING CLINICIAN: CHRISTOPHER GUDELIA   TECHNIQUE: Transabdominal and transvaginal imaging pelvis with grayscale, color Doppler and spectral Doppler   FINDINGS: Uterus is anteverted and measures 8.1 x 4.8 x 6.6 cm. No focal myometrial mass is noted. Endometrium has a maximum thickness of 9 mm. There is no  gestational sac noted within the uterus.   Right ovary measures 2.5 x 2.8 x 2.1 cm. A dominant sized follicle is noted in the ovary. Arterial Doppler signal was obtained from the right ovary with high velocity low resistance suggesting the dominant sized follicle is developing into a corpus luteum.   In the left adnexa there is a mass measuring 3.9 x 2.7 x 2.8 cm which contains a hyperechoic ring and an internal fluid collection within which there appears to be a yolk sac. No embryo is identified. Mean diameter of the fluid collection is 9 mm, increased from 5 mm on the prior study. No embryo is identified. Left ovary is not definitely identified.   Small amount of free fluid is present.       Left adnexal ectopic pregnancy with a mean diameter of the sac 9 mm, increased from 5 mm on the prior study. Yolk sac is noted but there is no embryo visible.   Small amount of free fluid     MACRO: Almita Novoa discussed the significance and urgency of this critical finding by telephone with  AILYN GALEAS on 7/26/2024 at 1:52 pm.  (**-RCF-**) Findings:  See findings.   Signed by: Almita Novoa 7/26/2024 1:54 PM Dictation workstation:   [id number]    US PELVIS OB TRANSABDOMINAL W TRANSVAGINAL UP TO 1ST TRIMESTER    Result Date: 7/24/2024  Interpreted By:  Ezekiel Hernandez, STUDY: US PELVIS OB TRANSABDOMINAL W TRANSVAGINAL UP TO 1ST TRIMESTER; ; 7/24/2024 1:08 am   INDICATION: Signs/Symptoms:r/o ectopic 5 wks unlikely to see IUP.  Quantitative beta HCG value of 2,705.   COMPARISON: None.   ACCESSION NUMBER(S): [id number]   ORDERING CLINICIAN: ESTER CASTRO   TECHNIQUE: Grayscale, color and spectral Doppler ultrasound evaluation of the pelvis was performed transabdominally and transvaginally.   FINDINGS: UTERUS: Normal, measuring 9.4 x 4.9 x 5.2 cm. ENDOMETRIUM: The endometrium measures 10 mm in thickness. There is a small volume of fluid in the endometrial canal. No gestational sac or sac-like structure is  present in the endometrial canal. RIGHT OVARY: Normal, measuring 2.6 x 1.9 x 1.8 cm, incidentally noting a corpus luteum. Normal arterial and venous flow present. LEFT OVARY: Left ovary appears normal and measures 2.4 x 1.7 x 1.7 cm. Normal arterial and venous flow present.There is an extra-ovarian cystic mass with peripheral hyperechoic rim in the left adnexa , with an internal thin-walled cystic structure suggesting a yolk sac, overall suggestive of an ectopic gestational sac. The mass in total measures 16 x 9 x 10 mm. CUL DE SAC: No free-fluid.       Cystic mass extra-ovarian left adnexal mass with suggestion of a yolk sac within it, overall suggestive of a left adnexal ectopic pregnancy. No significant pelvic fluid is seen to suggest rupture. Gynecology/obstetric consultation recommended.     MACRO: None   Signed by: Ezekiel Hernandez 2024 1:31 AM Dictation workstation:   [id number]        Assessment/Plan     Idania Andersen is 35yo  who presented to an OSH for left lower abdominal pain found to have a left adnexal ectopic pregnancy on imaging, transferred for [facility] for medical management of ectopic pregnancy, currently in stable condition without signs of an acute abdomen     -bHcg trend 2705 () --> 3085 () --> 5669   -Recommend the following labs: bHCG, T &S, CMP, CBC, results reviewed.   -Patient counseled on management options, discussed 14 % risk of medical treatment failure in the setting of her Hcg levels higher than 5000 and the option of surgery. She decided to go forward with methotrexate treatment, expressed understanding.    -  96.5 mg of Methotrexate is given IM in 2 divided doses on right and left gluteus for treatment of ectopic pregnancy.  - Repeat Hcg ordered on  and .   - Patient is provided with educational handouts and hcg testing dates.  - Reached out to Dr. Covington for follow up and testing information.    Seen and discussed with Dr. Melly Moses MD  PGY2

## 2024-07-27 DIAGNOSIS — O36.80X0 PREGNANCY OF UNKNOWN ANATOMIC LOCATION (HHS-HCC): Primary | ICD-10-CM

## 2024-07-27 NOTE — DISCHARGE INSTRUCTIONS
Please follow-up with OB/GYN as recommended.  If you have any new or concerning symptoms, worsening bleeding, abdominal pain, shortness of breath, dizziness or any significant new or concerning symptoms please present back to the emergency department.

## 2024-07-29 ENCOUNTER — LAB (OUTPATIENT)
Dept: LAB | Facility: LAB | Age: 34
End: 2024-07-29
Payer: COMMERCIAL

## 2024-07-29 DIAGNOSIS — O36.80X0 PREGNANCY OF UNKNOWN ANATOMIC LOCATION (HHS-HCC): ICD-10-CM

## 2024-07-29 LAB — B-HCG SERPL-ACNC: ABNORMAL MIU/ML

## 2024-07-29 PROCEDURE — 36415 COLL VENOUS BLD VENIPUNCTURE: CPT

## 2024-07-29 PROCEDURE — 84702 CHORIONIC GONADOTROPIN TEST: CPT

## 2024-07-30 ASSESSMENT — ENCOUNTER SYMPTOMS
ABDOMINAL PAIN: 0
DIARRHEA: 0
NAUSEA: 0
BACK PAIN: 0
FEVER: 0
HEMATURIA: 0
CONSTIPATION: 1
SORE THROAT: 0
FLANK PAIN: 0
HEADACHES: 0
VOMITING: 0
FREQUENCY: 0
DYSURIA: 0
CHILLS: 0

## 2024-07-30 NOTE — RESULT ENCOUNTER NOTE
Reviewed hCG trend with patient.  Patient is receiving methotrexate therapy.  This value was from methotrexate day 4.  Between methotrexate days 1 and 4 hCG often increases.  Will reevaluate hCG trend on day 7 to determine whether or not patient requires an additional dose of methotrexate or surgical intervention.  Patient denies significant lower abdominal pain or symptoms of anemia.  Patient is amenable to continuing observation given the methotrexate that week.  Patient was counseled that if she no longer feels comfortable trending betas with methotrexate surgical intervention is an appropriate option for her.  Patient declined surgery at this time.  At this time we will continue observation.

## 2024-07-31 NOTE — PROGRESS NOTES
Subjective   Patient ID: Idania Andersen is a 34 y.o. female who presents for Annual Exam (Last pap 22 wnl. HPV -. ).  Pt here initially for annual exam. However UPT was positive. Pt desires Termination of pregancy and permanent contraception.         Review of Systems   Constitutional:  Negative for chills and fever.   HENT:  Negative for sore throat.    Gastrointestinal:  Positive for constipation. Negative for abdominal pain, diarrhea, nausea and vomiting.   Genitourinary:  Negative for dysuria, flank pain, frequency, hematuria, pelvic pain, urgency and vaginal discharge.   Musculoskeletal:  Negative for back pain.   Skin:  Negative for rash.   Neurological:  Negative for headaches.       Objective   Physical Exam  Vitals reviewed.   Constitutional:       General: She is not in acute distress.     Appearance: She is not ill-appearing.   Pulmonary:      Effort: Pulmonary effort is normal.   Skin:     Coloration: Skin is not pale.   Neurological:      Mental Status: She is alert.         Assessment/Plan   Diagnoses and all orders for this visit:  Pregnant state, incidental ([facility])  -     POCT pregnancy, urine manually resulted  Sterilization consult    Pt here with new +UPT. Desires termination. Will follow up with  clinic. Pt also desires LSC BS vs Cu-IUD. Sterilization Consent form signed. Will follow up in 1 month for surgical planning vs Cu-IUD placement.         Neptali Covington MD 24 10:44 PM

## 2024-08-02 ENCOUNTER — HOSPITAL ENCOUNTER (OUTPATIENT)
Facility: HOSPITAL | Age: 34
End: 2024-08-02
Attending: OBSTETRICS & GYNECOLOGY | Admitting: OBSTETRICS & GYNECOLOGY
Payer: COMMERCIAL

## 2024-08-02 ENCOUNTER — TELEPHONE (OUTPATIENT)
Dept: OBSTETRICS AND GYNECOLOGY | Facility: CLINIC | Age: 34
End: 2024-08-02

## 2024-08-02 ENCOUNTER — HOSPITAL ENCOUNTER (OUTPATIENT)
Facility: HOSPITAL | Age: 34
Discharge: HOME | End: 2024-08-02
Attending: OBSTETRICS & GYNECOLOGY | Admitting: OBSTETRICS & GYNECOLOGY
Payer: COMMERCIAL

## 2024-08-02 ENCOUNTER — LAB (OUTPATIENT)
Dept: LAB | Facility: LAB | Age: 34
End: 2024-08-02
Payer: COMMERCIAL

## 2024-08-02 VITALS
DIASTOLIC BLOOD PRESSURE: 58 MMHG | BODY MASS INDEX: 31.42 KG/M2 | HEIGHT: 65 IN | TEMPERATURE: 98.8 F | RESPIRATION RATE: 18 BRPM | SYSTOLIC BLOOD PRESSURE: 128 MMHG | HEART RATE: 83 BPM | WEIGHT: 188.6 LBS | OXYGEN SATURATION: 98 %

## 2024-08-02 DIAGNOSIS — O36.80X0 PREGNANCY OF UNKNOWN ANATOMIC LOCATION (HHS-HCC): ICD-10-CM

## 2024-08-02 LAB — B-HCG SERPL-ACNC: ABNORMAL MIU/ML

## 2024-08-02 PROCEDURE — 84702 CHORIONIC GONADOTROPIN TEST: CPT

## 2024-08-02 PROCEDURE — 96372 THER/PROPH/DIAG INJ SC/IM: CPT | Performed by: OBSTETRICS & GYNECOLOGY

## 2024-08-02 PROCEDURE — 36415 COLL VENOUS BLD VENIPUNCTURE: CPT

## 2024-08-02 PROCEDURE — 99214 OFFICE O/P EST MOD 30 MIN: CPT

## 2024-08-02 PROCEDURE — 99215 OFFICE O/P EST HI 40 MIN: CPT | Performed by: OBSTETRICS & GYNECOLOGY

## 2024-08-02 PROCEDURE — 2500000004 HC RX 250 GENERAL PHARMACY W/ HCPCS (ALT 636 FOR OP/ED): Performed by: OBSTETRICS & GYNECOLOGY

## 2024-08-02 RX ORDER — METHOTREXATE 25 MG/ML
50 INJECTION INTRA-ARTERIAL; INTRAMUSCULAR; INTRATHECAL; INTRAVENOUS ONCE
Status: COMPLETED | OUTPATIENT
Start: 2024-08-02 | End: 2024-08-02

## 2024-08-02 RX ADMIN — METHOTREXATE 99 MG: 25 INJECTION, SOLUTION INTRA-ARTERIAL; INTRAMUSCULAR; INTRAVENOUS at 21:49

## 2024-08-02 NOTE — TELEPHONE ENCOUNTER
Called patient to review hCG trend.  Today's methotrexate day 8 blood was not drawn on methotrexate day 7.  On methotrexate day 4 hCG level was in the high 10,000's and now on day 8 hCG level is in the low 11,000's.  This overall represents a plateau.  Counseled patient on this finding and that management options include giving an additional dose of methotrexate or surgery.  Counseled that giving an additional dose of methotrexate would continue to carry the risk of ruptured ectopic pregnancy.  Patient is overall asymptomatic though she does feel some abdominal discomfort as she needs to have a bowel movement and is somewhat constipated.  At this time patient prefers management with methotrexate.  Patient to present to [facility] OB triage for methotrexate administration.  Notified Dr. Sandoval who will be the OB attending on-call this evening.

## 2024-08-03 NOTE — H&P
Obstetrical Admission History and Physical     Patient in triage for methotrexate dose.  Known left-sided ectopic pregnancy.  Previous methotrexate treatment without 15% drop in hCG.  Asymptomatic without significant pain or bleeding.  Fully counseled regarding options of surgery versus repeat methotrexate.  Here for repeat methotrexate dose  Precautions given     Obstetrical History   OB History    Para Term  AB Living   5 2 2     2   SAB IAB Ectopic Multiple Live Births           2      # Outcome Date GA Lbr [place]/2nd Weight Sex Type Anes PTL Lv   5 Current            4 Term 16   3.884 kg M Vag-Spont EPI  SOTERO   3 Term 09 40w0d   M Vag-Spont EPI  SOTERO   2             1                 Past Medical History  Past Medical History:   Diagnosis Date    Acute tonsillitis, unspecified 2014    Acute tonsillitis    Acute vaginitis 2015    BV (bacterial vaginosis)    Constipation, unspecified 2015    Acute constipation    Encounter for screening for malignant neoplasm of cervix 2018    Cervical cancer screening    Personal history of other diseases of the female genital tract 10/15/2015    History of vaginal discharge    Personal history of other diseases of the female genital tract 10/15/2015    History of vaginitis    Personal history of other diseases of the nervous system and sense organs 10/10/2014    History of acute otitis media    Personal history of other diseases of the respiratory system 2018    History of acute sinusitis    Personal history of other infectious and parasitic diseases 2015    History of candidiasis of vagina    Personal history of other specified conditions 2018    History of dysuria    Personal history of other specified conditions 2014    History of urinary frequency    Personal history of urinary (tract) infections 2018    History of urinary tract infection    Pregnant state, incidental ([facility])  "10/15/2015    Pregnancy, incidental    Unspecified asthma, uncomplicated ([facility]) 09/11/2017    Asthma        Past Surgical History   No past surgical history on file.    Social History  Social History     Tobacco Use    Smoking status: Never     Passive exposure: Never    Smokeless tobacco: Never   Substance Use Topics    Alcohol use: Never     Substance and Sexual Activity   Drug Use Never       Allergies  Apricot, Cherry, Kiwi, Peach, and Plum     Medications  Medications Prior to Admission   Medication Sig Dispense Refill Last Dose    albuterol 90 mcg/actuation inhaler Inhale 2 puffs every 4 hours if needed.       cholecalciferol (Vitamin D-3) 50 MCG (2000 UT) tablet Take by mouth.       magnesium, as gluconate, (Magonate) 27 mg magnesium (500 mg) tablet Take 1 tablet (27 mg) by mouth 2 times a day.       multivitamin with minerals (Hair,Skin and Nails) tablet Take 1 tablet by mouth once daily.          Objective    Last Vitals  Temp Pulse Resp BP MAP O2 Sat     76   128/58 84 98 %     Physical Examination  Abdomen is soft nondistended bowel sounds positive no masses palpated.  Mild tenderness left lower quadrant without rebound    Lab Review  No results found for: \"ABO\", \"LABRH\", \"ABSCRN\"  No results found for: \"WBC\", \"HGB\", \"HCT\", \"PLT\"  Hemoglobin 10.6.  Blood type a positive.  hCG 11,000    "

## 2024-08-03 NOTE — SIGNIFICANT EVENT
Patient given methotrexate 98 mg.  44 mg per buttocks.  Tolerated well without reaction  Discharged home with precautions  Advised to follow-up labs

## 2024-08-06 ENCOUNTER — APPOINTMENT (OUTPATIENT)
Dept: OBSTETRICS AND GYNECOLOGY | Facility: CLINIC | Age: 34
End: 2024-08-06
Payer: COMMERCIAL

## 2024-08-06 DIAGNOSIS — O36.80X0 PREGNANCY OF UNKNOWN ANATOMIC LOCATION (HHS-HCC): ICD-10-CM

## 2024-08-06 DIAGNOSIS — O00.80 OTHER ECTOPIC PREGNANCY WITHOUT INTRAUTERINE PREGNANCY (HHS-HCC): Primary | ICD-10-CM

## 2024-08-06 NOTE — PROGRESS NOTES
FMLA paperwork completed and faxed  Documentation will be uploaded to [employer]  Patient is refaxing disability paperwork  Odalys Stevens RN

## 2024-08-07 ENCOUNTER — LAB (OUTPATIENT)
Dept: LAB | Facility: LAB | Age: 34
End: 2024-08-07
Payer: COMMERCIAL

## 2024-08-07 DIAGNOSIS — O00.80 OTHER ECTOPIC PREGNANCY WITHOUT INTRAUTERINE PREGNANCY (HHS-HCC): ICD-10-CM

## 2024-08-07 LAB — B-HCG SERPL-ACNC: 6807 MIU/ML

## 2024-08-07 PROCEDURE — 36415 COLL VENOUS BLD VENIPUNCTURE: CPT

## 2024-08-07 PROCEDURE — 84702 CHORIONIC GONADOTROPIN TEST: CPT

## 2024-08-08 ENCOUNTER — ANESTHESIA EVENT (OUTPATIENT)
Dept: OPERATING ROOM | Facility: HOSPITAL | Age: 34
End: 2024-08-08
Payer: COMMERCIAL

## 2024-08-08 ENCOUNTER — HOSPITAL ENCOUNTER (EMERGENCY)
Facility: HOSPITAL | Age: 34
Discharge: HOME | End: 2024-08-08
Attending: EMERGENCY MEDICINE
Payer: COMMERCIAL

## 2024-08-08 ENCOUNTER — APPOINTMENT (OUTPATIENT)
Dept: RADIOLOGY | Facility: HOSPITAL | Age: 34
End: 2024-08-08
Payer: COMMERCIAL

## 2024-08-08 ENCOUNTER — ANESTHESIA (OUTPATIENT)
Dept: OPERATING ROOM | Facility: HOSPITAL | Age: 34
End: 2024-08-08
Payer: COMMERCIAL

## 2024-08-08 ENCOUNTER — PREP FOR PROCEDURE (OUTPATIENT)
Dept: OBSTETRICS AND GYNECOLOGY | Facility: CLINIC | Age: 34
End: 2024-08-08
Payer: COMMERCIAL

## 2024-08-08 VITALS
TEMPERATURE: 96.8 F | WEIGHT: 180 LBS | RESPIRATION RATE: 11 BRPM | HEART RATE: 73 BPM | SYSTOLIC BLOOD PRESSURE: 130 MMHG | HEIGHT: 64 IN | DIASTOLIC BLOOD PRESSURE: 68 MMHG | OXYGEN SATURATION: 97 % | BODY MASS INDEX: 30.73 KG/M2

## 2024-08-08 DIAGNOSIS — O00.102 LEFT TUBAL PREGNANCY WITHOUT INTRAUTERINE PREGNANCY (HHS-HCC): ICD-10-CM

## 2024-08-08 DIAGNOSIS — O00.112: Primary | ICD-10-CM

## 2024-08-08 DIAGNOSIS — O36.80X0 PREGNANCY OF UNKNOWN ANATOMIC LOCATION (HHS-HCC): Primary | ICD-10-CM

## 2024-08-08 DIAGNOSIS — O00.112: ICD-10-CM

## 2024-08-08 LAB
ABO GROUP (TYPE) IN BLOOD: NORMAL
ALBUMIN SERPL BCP-MCNC: 4 G/DL (ref 3.4–5)
ALP SERPL-CCNC: 39 U/L (ref 33–110)
ALT SERPL W P-5'-P-CCNC: 14 U/L (ref 7–45)
ANION GAP SERPL CALC-SCNC: 15 MMOL/L (ref 10–20)
ANTIBODY SCREEN: NORMAL
APPEARANCE UR: CLEAR
APTT PPP: 26 SECONDS (ref 27–38)
AST SERPL W P-5'-P-CCNC: 12 U/L (ref 9–39)
B-HCG SERPL-ACNC: 5520 MIU/ML
BACTERIA #/AREA URNS AUTO: ABNORMAL /HPF
BASOPHILS # BLD AUTO: 0.03 X10*3/UL (ref 0–0.1)
BASOPHILS NFR BLD AUTO: 0.3 %
BILIRUB SERPL-MCNC: 0.4 MG/DL (ref 0–1.2)
BILIRUB UR STRIP.AUTO-MCNC: NEGATIVE MG/DL
BUN SERPL-MCNC: 11 MG/DL (ref 6–23)
CALCIUM SERPL-MCNC: 8.5 MG/DL (ref 8.6–10.3)
CHLORIDE SERPL-SCNC: 106 MMOL/L (ref 98–107)
CO2 SERPL-SCNC: 19 MMOL/L (ref 21–32)
COLOR UR: COLORLESS
CREAT SERPL-MCNC: 0.77 MG/DL (ref 0.5–1.05)
EGFRCR SERPLBLD CKD-EPI 2021: >90 ML/MIN/1.73M*2
EOSINOPHIL # BLD AUTO: 0.04 X10*3/UL (ref 0–0.7)
EOSINOPHIL NFR BLD AUTO: 0.4 %
ERYTHROCYTE [DISTWIDTH] IN BLOOD BY AUTOMATED COUNT: 12.6 % (ref 11.5–14.5)
GLUCOSE SERPL-MCNC: 99 MG/DL (ref 74–99)
GLUCOSE UR STRIP.AUTO-MCNC: NORMAL MG/DL
HCT VFR BLD AUTO: 30.5 % (ref 36–46)
HGB BLD-MCNC: 10.1 G/DL (ref 12–16)
IMM GRANULOCYTES # BLD AUTO: 0.04 X10*3/UL (ref 0–0.7)
IMM GRANULOCYTES NFR BLD AUTO: 0.4 % (ref 0–0.9)
INR PPP: 1.1 (ref 0.9–1.1)
KETONES UR STRIP.AUTO-MCNC: NEGATIVE MG/DL
LEUKOCYTE ESTERASE UR QL STRIP.AUTO: NEGATIVE
LIPASE SERPL-CCNC: 38 U/L (ref 9–82)
LYMPHOCYTES # BLD AUTO: 1.23 X10*3/UL (ref 1.2–4.8)
LYMPHOCYTES NFR BLD AUTO: 13.5 %
MCH RBC QN AUTO: 30.1 PG (ref 26–34)
MCHC RBC AUTO-ENTMCNC: 33.1 G/DL (ref 32–36)
MCV RBC AUTO: 91 FL (ref 80–100)
MONOCYTES # BLD AUTO: 0.34 X10*3/UL (ref 0.1–1)
MONOCYTES NFR BLD AUTO: 3.7 %
MUCOUS THREADS #/AREA URNS AUTO: ABNORMAL /LPF
NEUTROPHILS # BLD AUTO: 7.41 X10*3/UL (ref 1.2–7.7)
NEUTROPHILS NFR BLD AUTO: 81.7 %
NITRITE UR QL STRIP.AUTO: NEGATIVE
NRBC BLD-RTO: 0 /100 WBCS (ref 0–0)
PH UR STRIP.AUTO: 6 [PH]
PLATELET # BLD AUTO: 525 X10*3/UL (ref 150–450)
POTASSIUM SERPL-SCNC: 3.7 MMOL/L (ref 3.5–5.3)
PROT SERPL-MCNC: 7.4 G/DL (ref 6.4–8.2)
PROT UR STRIP.AUTO-MCNC: ABNORMAL MG/DL
PROTHROMBIN TIME: 11.9 SECONDS (ref 9.8–12.8)
RBC # BLD AUTO: 3.36 X10*6/UL (ref 4–5.2)
RBC # UR STRIP.AUTO: ABNORMAL /UL
RBC #/AREA URNS AUTO: ABNORMAL /HPF
RH FACTOR (ANTIGEN D): NORMAL
SODIUM SERPL-SCNC: 136 MMOL/L (ref 136–145)
SP GR UR STRIP.AUTO: 1.01
SQUAMOUS #/AREA URNS AUTO: ABNORMAL /HPF
UROBILINOGEN UR STRIP.AUTO-MCNC: NORMAL MG/DL
WBC # BLD AUTO: 9.1 X10*3/UL (ref 4.4–11.3)
WBC #/AREA URNS AUTO: ABNORMAL /HPF

## 2024-08-08 PROCEDURE — 85730 THROMBOPLASTIN TIME PARTIAL: CPT | Performed by: EMERGENCY MEDICINE

## 2024-08-08 PROCEDURE — 7100000001 HC RECOVERY ROOM TIME - INITIAL BASE CHARGE: Performed by: OBSTETRICS & GYNECOLOGY

## 2024-08-08 PROCEDURE — 96374 THER/PROPH/DIAG INJ IV PUSH: CPT

## 2024-08-08 PROCEDURE — 7100000010 HC PHASE TWO TIME - EACH INCREMENTAL 1 MINUTE: Performed by: OBSTETRICS & GYNECOLOGY

## 2024-08-08 PROCEDURE — 83690 ASSAY OF LIPASE: CPT | Performed by: EMERGENCY MEDICINE

## 2024-08-08 PROCEDURE — 76801 OB US < 14 WKS SINGLE FETUS: CPT

## 2024-08-08 PROCEDURE — A59151 PR RX ECTOP PREG BY SCOPE,RMV TUBE/OVRY: Performed by: STUDENT IN AN ORGANIZED HEALTH CARE EDUCATION/TRAINING PROGRAM

## 2024-08-08 PROCEDURE — 2500000004 HC RX 250 GENERAL PHARMACY W/ HCPCS (ALT 636 FOR OP/ED): Performed by: EMERGENCY MEDICINE

## 2024-08-08 PROCEDURE — 2500000005 HC RX 250 GENERAL PHARMACY W/O HCPCS

## 2024-08-08 PROCEDURE — 59151 TREAT ECTOPIC PREGNANCY: CPT | Performed by: OBSTETRICS & GYNECOLOGY

## 2024-08-08 PROCEDURE — 88305 TISSUE EXAM BY PATHOLOGIST: CPT | Mod: TC,AHULAB | Performed by: OBSTETRICS & GYNECOLOGY

## 2024-08-08 PROCEDURE — 86901 BLOOD TYPING SEROLOGIC RH(D): CPT | Performed by: EMERGENCY MEDICINE

## 2024-08-08 PROCEDURE — 99291 CRITICAL CARE FIRST HOUR: CPT | Performed by: EMERGENCY MEDICINE

## 2024-08-08 PROCEDURE — 80053 COMPREHEN METABOLIC PANEL: CPT | Performed by: EMERGENCY MEDICINE

## 2024-08-08 PROCEDURE — 3700000001 HC GENERAL ANESTHESIA TIME - INITIAL BASE CHARGE: Performed by: OBSTETRICS & GYNECOLOGY

## 2024-08-08 PROCEDURE — 2500000005 HC RX 250 GENERAL PHARMACY W/O HCPCS: Performed by: OBSTETRICS & GYNECOLOGY

## 2024-08-08 PROCEDURE — 7100000009 HC PHASE TWO TIME - INITIAL BASE CHARGE: Performed by: OBSTETRICS & GYNECOLOGY

## 2024-08-08 PROCEDURE — 96375 TX/PRO/DX INJ NEW DRUG ADDON: CPT

## 2024-08-08 PROCEDURE — 81001 URINALYSIS AUTO W/SCOPE: CPT | Performed by: EMERGENCY MEDICINE

## 2024-08-08 PROCEDURE — 2500000004 HC RX 250 GENERAL PHARMACY W/ HCPCS (ALT 636 FOR OP/ED): Performed by: OBSTETRICS & GYNECOLOGY

## 2024-08-08 PROCEDURE — 96376 TX/PRO/DX INJ SAME DRUG ADON: CPT

## 2024-08-08 PROCEDURE — 96374 THER/PROPH/DIAG INJ IV PUSH: CPT | Mod: 59

## 2024-08-08 PROCEDURE — 3700000002 HC GENERAL ANESTHESIA TIME - EACH INCREMENTAL 1 MINUTE: Performed by: OBSTETRICS & GYNECOLOGY

## 2024-08-08 PROCEDURE — 76801 OB US < 14 WKS SINGLE FETUS: CPT | Mod: FOREIGN READ | Performed by: RADIOLOGY

## 2024-08-08 PROCEDURE — A59151 PR RX ECTOP PREG BY SCOPE,RMV TUBE/OVRY

## 2024-08-08 PROCEDURE — 96376 TX/PRO/DX INJ SAME DRUG ADON: CPT | Mod: 59

## 2024-08-08 PROCEDURE — 3600000008 HC OR TIME - EACH INCREMENTAL 1 MINUTE - PROCEDURE LEVEL THREE: Performed by: OBSTETRICS & GYNECOLOGY

## 2024-08-08 PROCEDURE — 3600000003 HC OR TIME - INITIAL BASE CHARGE - PROCEDURE LEVEL THREE: Performed by: OBSTETRICS & GYNECOLOGY

## 2024-08-08 PROCEDURE — 2500000004 HC RX 250 GENERAL PHARMACY W/ HCPCS (ALT 636 FOR OP/ED)

## 2024-08-08 PROCEDURE — 2500000004 HC RX 250 GENERAL PHARMACY W/ HCPCS (ALT 636 FOR OP/ED): Performed by: STUDENT IN AN ORGANIZED HEALTH CARE EDUCATION/TRAINING PROGRAM

## 2024-08-08 PROCEDURE — 7100000002 HC RECOVERY ROOM TIME - EACH INCREMENTAL 1 MINUTE: Performed by: OBSTETRICS & GYNECOLOGY

## 2024-08-08 PROCEDURE — 2500000001 HC RX 250 WO HCPCS SELF ADMINISTERED DRUGS (ALT 637 FOR MEDICARE OP): Performed by: STUDENT IN AN ORGANIZED HEALTH CARE EDUCATION/TRAINING PROGRAM

## 2024-08-08 PROCEDURE — 84702 CHORIONIC GONADOTROPIN TEST: CPT | Performed by: EMERGENCY MEDICINE

## 2024-08-08 PROCEDURE — 96361 HYDRATE IV INFUSION ADD-ON: CPT

## 2024-08-08 PROCEDURE — 2720000007 HC OR 272 NO HCPCS: Performed by: OBSTETRICS & GYNECOLOGY

## 2024-08-08 PROCEDURE — 85610 PROTHROMBIN TIME: CPT | Performed by: EMERGENCY MEDICINE

## 2024-08-08 PROCEDURE — 96361 HYDRATE IV INFUSION ADD-ON: CPT | Mod: 59

## 2024-08-08 PROCEDURE — 85025 COMPLETE CBC W/AUTO DIFF WBC: CPT | Performed by: EMERGENCY MEDICINE

## 2024-08-08 PROCEDURE — 36415 COLL VENOUS BLD VENIPUNCTURE: CPT | Performed by: EMERGENCY MEDICINE

## 2024-08-08 RX ORDER — PROPOFOL 10 MG/ML
INJECTION, EMULSION INTRAVENOUS AS NEEDED
Status: DISCONTINUED | OUTPATIENT
Start: 2024-08-08 | End: 2024-08-08

## 2024-08-08 RX ORDER — MORPHINE SULFATE 4 MG/ML
4 INJECTION, SOLUTION INTRAMUSCULAR; INTRAVENOUS ONCE
Status: COMPLETED | OUTPATIENT
Start: 2024-08-08 | End: 2024-08-08

## 2024-08-08 RX ORDER — ROCURONIUM BROMIDE 10 MG/ML
INJECTION, SOLUTION INTRAVENOUS AS NEEDED
Status: DISCONTINUED | OUTPATIENT
Start: 2024-08-08 | End: 2024-08-08

## 2024-08-08 RX ORDER — FENTANYL CITRATE 50 UG/ML
INJECTION, SOLUTION INTRAMUSCULAR; INTRAVENOUS AS NEEDED
Status: DISCONTINUED | OUTPATIENT
Start: 2024-08-08 | End: 2024-08-08

## 2024-08-08 RX ORDER — CELECOXIB 200 MG/1
400 CAPSULE ORAL ONCE
Status: COMPLETED | OUTPATIENT
Start: 2024-08-08 | End: 2024-08-08

## 2024-08-08 RX ORDER — GABAPENTIN 600 MG/1
600 TABLET ORAL ONCE
Status: CANCELLED | OUTPATIENT
Start: 2024-08-08 | End: 2024-08-08

## 2024-08-08 RX ORDER — OXYCODONE HYDROCHLORIDE 5 MG/1
5 TABLET ORAL EVERY 4 HOURS PRN
Status: DISCONTINUED | OUTPATIENT
Start: 2024-08-08 | End: 2024-08-08 | Stop reason: HOSPADM

## 2024-08-08 RX ORDER — GABAPENTIN 300 MG/1
600 CAPSULE ORAL ONCE
Status: COMPLETED | OUTPATIENT
Start: 2024-08-08 | End: 2024-08-08

## 2024-08-08 RX ORDER — LIDOCAINE HYDROCHLORIDE 10 MG/ML
0.1 INJECTION, SOLUTION EPIDURAL; INFILTRATION; INTRACAUDAL; PERINEURAL ONCE
Status: DISCONTINUED | OUTPATIENT
Start: 2024-08-08 | End: 2024-08-08 | Stop reason: HOSPADM

## 2024-08-08 RX ORDER — ACETAMINOPHEN 325 MG/1
975 TABLET ORAL ONCE
Status: COMPLETED | OUTPATIENT
Start: 2024-08-08 | End: 2024-08-08

## 2024-08-08 RX ORDER — SODIUM CHLORIDE, SODIUM LACTATE, POTASSIUM CHLORIDE, CALCIUM CHLORIDE 600; 310; 30; 20 MG/100ML; MG/100ML; MG/100ML; MG/100ML
100 INJECTION, SOLUTION INTRAVENOUS CONTINUOUS
Status: DISCONTINUED | OUTPATIENT
Start: 2024-08-08 | End: 2024-08-08 | Stop reason: HOSPADM

## 2024-08-08 RX ORDER — SODIUM CHLORIDE 0.9 G/100ML
IRRIGANT IRRIGATION AS NEEDED
Status: DISCONTINUED | OUTPATIENT
Start: 2024-08-08 | End: 2024-08-08 | Stop reason: HOSPADM

## 2024-08-08 RX ORDER — KETOROLAC TROMETHAMINE 30 MG/ML
INJECTION, SOLUTION INTRAMUSCULAR; INTRAVENOUS AS NEEDED
Status: DISCONTINUED | OUTPATIENT
Start: 2024-08-08 | End: 2024-08-08

## 2024-08-08 RX ORDER — IBUPROFEN 600 MG/1
600 TABLET ORAL EVERY 6 HOURS PRN
Qty: 30 TABLET | Refills: 2 | Status: SHIPPED | OUTPATIENT
Start: 2024-08-08

## 2024-08-08 RX ORDER — LIDOCAINE HYDROCHLORIDE 20 MG/ML
INJECTION, SOLUTION EPIDURAL; INFILTRATION; INTRACAUDAL; PERINEURAL AS NEEDED
Status: DISCONTINUED | OUTPATIENT
Start: 2024-08-08 | End: 2024-08-08

## 2024-08-08 RX ORDER — CELECOXIB 400 MG/1
400 CAPSULE ORAL ONCE
Status: CANCELLED | OUTPATIENT
Start: 2024-08-08 | End: 2024-08-08

## 2024-08-08 RX ORDER — DIPHENHYDRAMINE HYDROCHLORIDE 50 MG/ML
12.5 INJECTION INTRAMUSCULAR; INTRAVENOUS ONCE AS NEEDED
Status: DISCONTINUED | OUTPATIENT
Start: 2024-08-08 | End: 2024-08-08 | Stop reason: HOSPADM

## 2024-08-08 RX ORDER — LABETALOL HYDROCHLORIDE 5 MG/ML
5 INJECTION, SOLUTION INTRAVENOUS ONCE AS NEEDED
Status: DISCONTINUED | OUTPATIENT
Start: 2024-08-08 | End: 2024-08-08 | Stop reason: HOSPADM

## 2024-08-08 RX ORDER — BUPIVACAINE HYDROCHLORIDE 5 MG/ML
INJECTION, SOLUTION PERINEURAL AS NEEDED
Status: DISCONTINUED | OUTPATIENT
Start: 2024-08-08 | End: 2024-08-08 | Stop reason: HOSPADM

## 2024-08-08 RX ORDER — ACETAMINOPHEN 325 MG/1
975 TABLET ORAL ONCE
Status: CANCELLED | OUTPATIENT
Start: 2024-08-08 | End: 2024-08-08

## 2024-08-08 RX ORDER — ONDANSETRON HYDROCHLORIDE 2 MG/ML
INJECTION, SOLUTION INTRAVENOUS AS NEEDED
Status: DISCONTINUED | OUTPATIENT
Start: 2024-08-08 | End: 2024-08-08

## 2024-08-08 RX ORDER — MIDAZOLAM HYDROCHLORIDE 1 MG/ML
INJECTION INTRAMUSCULAR; INTRAVENOUS AS NEEDED
Status: DISCONTINUED | OUTPATIENT
Start: 2024-08-08 | End: 2024-08-08

## 2024-08-08 RX ORDER — ONDANSETRON HYDROCHLORIDE 2 MG/ML
4 INJECTION, SOLUTION INTRAVENOUS ONCE AS NEEDED
Status: DISCONTINUED | OUTPATIENT
Start: 2024-08-08 | End: 2024-08-08 | Stop reason: HOSPADM

## 2024-08-08 ASSESSMENT — ACTIVITIES OF DAILY LIVING (ADL): LACK_OF_TRANSPORTATION: NO

## 2024-08-08 ASSESSMENT — PAIN SCALES - GENERAL
PAINLEVEL_OUTOF10: 8
PAINLEVEL_OUTOF10: 8
PAINLEVEL_OUTOF10: 5 - MODERATE PAIN
PAINLEVEL_OUTOF10: 7
PAINLEVEL_OUTOF10: 8
PAINLEVEL_OUTOF10: 2
PAINLEVEL_OUTOF10: 3
PAINLEVEL_OUTOF10: 0 - NO PAIN
PAINLEVEL_OUTOF10: 10 - WORST POSSIBLE PAIN
PAINLEVEL_OUTOF10: 2
PAINLEVEL_OUTOF10: 4

## 2024-08-08 ASSESSMENT — PAIN DESCRIPTION - ORIENTATION
ORIENTATION: MID
ORIENTATION: LOWER;LEFT
ORIENTATION: MID

## 2024-08-08 ASSESSMENT — PAIN - FUNCTIONAL ASSESSMENT
PAIN_FUNCTIONAL_ASSESSMENT: 0-10
PAIN_FUNCTIONAL_ASSESSMENT: UNABLE TO SELF-REPORT

## 2024-08-08 ASSESSMENT — COLUMBIA-SUICIDE SEVERITY RATING SCALE - C-SSRS
6. HAVE YOU EVER DONE ANYTHING, STARTED TO DO ANYTHING, OR PREPARED TO DO ANYTHING TO END YOUR LIFE?: NO
2. HAVE YOU ACTUALLY HAD ANY THOUGHTS OF KILLING YOURSELF?: NO
1. IN THE PAST MONTH, HAVE YOU WISHED YOU WERE DEAD OR WISHED YOU COULD GO TO SLEEP AND NOT WAKE UP?: NO

## 2024-08-08 ASSESSMENT — PAIN DESCRIPTION - ONSET: ONSET: ONGOING

## 2024-08-08 ASSESSMENT — PAIN DESCRIPTION - FREQUENCY: FREQUENCY: CONSTANT/CONTINUOUS

## 2024-08-08 ASSESSMENT — PAIN DESCRIPTION - PAIN TYPE: TYPE: ACUTE PAIN

## 2024-08-08 ASSESSMENT — PAIN DESCRIPTION - LOCATION
LOCATION: ABDOMEN
LOCATION: ABDOMEN
LOCATION: BACK
LOCATION: ABDOMEN

## 2024-08-08 ASSESSMENT — PAIN DESCRIPTION - DESCRIPTORS: DESCRIPTORS: SHARP

## 2024-08-08 NOTE — ED PROVIDER NOTES
HPI   Chief Complaint   Patient presents with    Flank Pain       34-year-old woman G5, P2 currently undergoing treatment for ectopic pregnancy status post methotrexate x 2 does present with complaint of worsening left lower quadrant pain x 2 days.  She also complains of constipation for the last week.  Denies any vaginal she has had vaginal bleeding since she started the methotrexate.  She denies any hematuria or left-sided rising flank pain.  Denies any fever.            Patient History   Past Medical History:   Diagnosis Date    Acute tonsillitis, unspecified 05/20/2014    Acute tonsillitis    Acute vaginitis 05/27/2015    BV (bacterial vaginosis)    Constipation, unspecified 02/18/2015    Acute constipation    Encounter for screening for malignant neoplasm of cervix 04/13/2018    Cervical cancer screening    Personal history of other diseases of the female genital tract 10/15/2015    History of vaginal discharge    Personal history of other diseases of the female genital tract 10/15/2015    History of vaginitis    Personal history of other diseases of the nervous system and sense organs 10/10/2014    History of acute otitis media    Personal history of other diseases of the respiratory system 09/26/2018    History of acute sinusitis    Personal history of other infectious and parasitic diseases 06/26/2015    History of candidiasis of vagina    Personal history of other specified conditions 07/12/2018    History of dysuria    Personal history of other specified conditions 05/20/2014    History of urinary frequency    Personal history of urinary (tract) infections 07/12/2018    History of urinary tract infection    Pregnant state, incidental ([facility]) 10/15/2015    Pregnancy, incidental    Unspecified asthma, uncomplicated ([facility]) 09/11/2017    Asthma     No past surgical history on file.  No family history on file.  Social History     Tobacco Use    Smoking status: Never     Passive exposure: Never    Smokeless  tobacco: Never   Vaping Use    Vaping status: Never Used   Substance Use Topics    Alcohol use: Never    Drug use: Never       Physical Exam   ED Triage Vitals [08/08/24 0639]   Temperature Heart Rate Respirations BP   36.8 °C (98.3 °F) 87 16 142/67      Pulse Ox Temp Source Heart Rate Source Patient Position   99 % Temporal Monitor Sitting      BP Location FiO2 (%)     Left arm --       Physical Exam  Vitals and nursing note reviewed.   Constitutional:       Appearance: Normal appearance. She is normal weight.   HENT:      Head: Normocephalic and atraumatic.      Nose: Nose normal.      Mouth/Throat:      Mouth: Mucous membranes are moist.      Pharynx: Oropharynx is clear.   Eyes:      Extraocular Movements: Extraocular movements intact.      Conjunctiva/sclera: Conjunctivae normal.      Pupils: Pupils are equal, round, and reactive to light.   Cardiovascular:      Rate and Rhythm: Normal rate and regular rhythm.      Pulses: Normal pulses.      Heart sounds: Normal heart sounds.   Pulmonary:      Effort: Pulmonary effort is normal.      Breath sounds: Normal breath sounds.   Abdominal:      General: Abdomen is flat. Bowel sounds are normal. There is no distension.      Palpations: Abdomen is soft.      Tenderness: There is abdominal tenderness. There is no right CVA tenderness, left CVA tenderness, guarding or rebound.      Comments: Tenderness to palpation in the left lower quadrant   Musculoskeletal:         General: Normal range of motion.   Skin:     General: Skin is warm and dry.      Capillary Refill: Capillary refill takes less than 2 seconds.   Neurological:      General: No focal deficit present.      Mental Status: She is alert and oriented to person, place, and time. Mental status is at baseline.      Sensory: No sensory deficit.      Motor: No weakness.   Psychiatric:         Mood and Affect: Mood normal.         Behavior: Behavior normal.         Thought Content: Thought content normal.           ED  Course & MDM   ED Course as of 08/10/24 2349   Thu Aug 08, 2024   1205 HCG, Beta-Quantitative(!): 5,520 [PV]      ED Course User Index  [PV] Aiden Alfonso MD         Diagnoses as of 08/10/24 2349   Left tubal pregnancy without intrauterine pregnancy ([facility])   Pregnancy of unknown anatomic location ([facility])                 No data recorded     Whitman Coma Scale Score: 15 (08/08/24 0657 : Maryam Bryant RN)                           Medical Decision Making  IV is placed and labs drawn including CBC, CMP, UA, type and screen hCG quantitative, ultrasound of the pelvis to rule out ruptured ectopic and patient is given morphine and Zofran and fluids.  Patient ultrasound does show ectopic pregnancy in the left ovary with some surrounding blood.  Hemoglobin is stable.  Given her worsening pain and these findings I did consult Dr. Sandoval who did see the patient previously.  He does recommend taking patient to the operating room for surgical management.  Patient did have recurrence of pain treated again with morphine.  She is in agreement for surgical management.        Procedure  Procedures     Aiden Alfonso MD  08/10/24 2349       Aiden Alfonso MD  08/10/24 3458

## 2024-08-08 NOTE — DISCHARGE INSTRUCTIONS
Please call Dr. Sandoval's Office for:    - Bleeding more than two pads per hour  - Pain not resolved with tylenol/ibuprofen  - Fever >100.4 degrees  - Chest pain/Shortness of breath  - Call 911 if you are experiencing a medical emergency

## 2024-08-08 NOTE — PROGRESS NOTES
Subjective   Patient ID: Idania Andersen is a 34 y.o. female who presents for No chief complaint on file..  HPI  Patient is a 34-year-old female presents to the emergency room with increasing left lower quadrant pain.  Known history of ectopic pregnancy.  Status post methotrexate x 2.  hCG is following but now with significant increased left lower abdominal pain.  Review of Systems    Objective   Physical Exam  Ultrasound reveals blood products in the left adnexa.  Assessment/Plan   Increased abdominal pain known ectopic pregnancy.    Discussed options and will proceed with a laparoscopic left salpingectomy.  Will further discuss options for birth control.         Melvin Sandoval MD 08/08/24 12:15 PM

## 2024-08-08 NOTE — PROGRESS NOTES
Transitional Care Coordination Progress Note:  Plan per Medical/Surgical team: treatment of left tubal pregnancy with OB/GYN consult & surgery today-laparoscopic left salpingectomy   Status: ED  Payor source: [employer]   Discharge disposition: Home alone   Potential Barriers: constipation  ADOD: 8/9/2024   TAMI Anna RN, BSN Transitional Care Coordinator ED# [phone]      08/08/24 1236   Discharge Planning   Living Arrangements Alone   Support Systems Parent   Assistance Needed surgery OB/GYN   Type of Residence Private residence   Number of Stairs to Enter Residence 3   Number of Stairs Within Residence 15   Home or Post Acute Services None   Expected Discharge Disposition Home   Does the patient need discharge transport arranged? No   Financial Resource Strain   How hard is it for you to pay for the very basics like food, housing, medical care, and heating? Not hard   Transportation Needs   In the past 12 months, has lack of transportation kept you from medical appointments or from getting medications? no   In the past 12 months, has lack of transportation kept you from meetings, work, or from getting things needed for daily living? No

## 2024-08-08 NOTE — ANESTHESIA POSTPROCEDURE EVALUATION
Patient: Idania Andersen    Procedure Summary       Date: 08/08/24 Room / Location: [facility] OR 01 / Virtual [facility] OR    Anesthesia Start: 1532 Anesthesia Stop: 1651    Procedure: Left Surgical Intervention Laparoscopy Ectopic Pregnancy (Left) Diagnosis:       Left tubal pregnancy with intrauterine pregnancy (HHS-HCC)      (Left tubal pregnancy with intrauterine pregnancy (HHS-HCC) [O00.112])    Surgeons: Melvin Sandoval MD Responsible Provider: Neptali Ross MD    Anesthesia Type: general ASA Status: 2            Anesthesia Type: general    Vitals Value Taken Time   /83 08/08/24 1717   Temp 36.5 °C (97.7 °F) 08/08/24 1645   Pulse 88 08/08/24 1721   Resp 15 08/08/24 1715   SpO2 100 % 08/08/24 1721   Vitals shown include unfiled device data.    Anesthesia Post Evaluation    Patient location during evaluation: bedside  Patient participation: complete - patient participated  Level of consciousness: awake  Pain management: adequate  Multimodal analgesia pain management approach  Airway patency: patent  Cardiovascular status: stable  Respiratory status: spontaneous ventilation and unassisted  Hydration status: acceptable  Postoperative Nausea and Vomiting: none  Comments: No significant PONV.        There were no known notable events for this encounter.

## 2024-08-08 NOTE — ED NOTES
Community Resource Name: No primary care physician  Phone Number:   Staff Member: Kristine Samson     Discussed the following topics on behalf of the patient:  []  Behavioral Health Assistance     []  Case Management  []   Assistance  []  Digital Equity Assistance  []  Dental Health Assistance  []  Education Assistance  []  Employment Assistance  []  Financial Strain Relief Assistance  []  Food Insecurity Assistance  [x]  Healthcare Coverage Assistance  []  Housing Stability Assistance  []  IP Violence Relief Assistance  []  Legal Assistance  []  Physical Activity Assistance  []  Social Connection Assistance  []  Stress Relief Assistance   []  Substance Abuse Assistance  []  Transportation Assistance  []  Utility Assistance  [x]  Other: [insert comment here]  Patient does have a PCP. W updated the patient chart.  Next Steps:         VICTOR MANUEL Mobley  HW talked to patient regarding not having a primary care physician. Patient expressed that she does have a primary care [facility]. W updated the patient chart with physician name added.        VICTOR MANUEL Mobley  08/08/24 8484

## 2024-08-08 NOTE — PROGRESS NOTES
Home alone      08/08/24 1236   Current Planned Discharge Disposition   Current Planned Discharge Disposition Home

## 2024-08-08 NOTE — H&P
History Of Present Illness  Idania Andersen is a 34 y.o. female presenting with IP GYN HPI/REASON FOR CONSULT: Ectopic Pregnancy .     Past Medical History  She has a past medical history of Acute tonsillitis, unspecified (2014), Acute vaginitis (2015), Constipation, unspecified (2015), Encounter for screening for malignant neoplasm of cervix (2018), Personal history of other diseases of the female genital tract (10/15/2015), Personal history of other diseases of the female genital tract (10/15/2015), Personal history of other diseases of the nervous system and sense organs (10/10/2014), Personal history of other diseases of the respiratory system (2018), Personal history of other infectious and parasitic diseases (2015), Personal history of other specified conditions (2018), Personal history of other specified conditions (2014), Personal history of urinary (tract) infections (2018), Pregnant state, incidental ([facility]) (10/15/2015), and Unspecified asthma, uncomplicated ([facility]) (2017).    Surgical History  She has no past surgical history on file.     OB History  OB History    Para Term  AB Living   5 2 2     2   SAB IAB Ectopic Multiple Live Births           2      # Outcome Date GA Lbr [place]/2nd Weight Sex Type Anes PTL Lv   5 Current            4 Term 16   3.884 kg M Vag-Spont EPI  SOTERO   3 Term 09 40w0d   M Vag-Spont EPI  SOTERO   2             1                 Sexual History  Social History     Substance and Sexual Activity   Sexual Activity Yes    Partners: Male    Birth control/protection: None        Social History  She reports that she has never smoked. She has never been exposed to tobacco smoke. She has never used smokeless tobacco. She reports that she does not drink alcohol and does not use drugs.    Family History  No family history on file.     Allergies  Apricot, Cherry, Kiwi, Peach, and Plum    Review  "of Systems     Physical Exam     Last Recorded Vitals  Blood pressure 144/76, pulse 82, temperature 36.5 °C (97.7 °F), temperature source Temporal, resp. rate 13, height 1.626 m (5' 4\"), weight 81.6 kg (180 lb), last menstrual period 06/19/2024, SpO2 100%.    Relevant Results  Medications    Current Facility-Administered Medications:     diphenhydrAMINE (BENADryl) injection 12.5 mg, 12.5 mg, intravenous, Once PRN, Neptali Ross MD    HYDROmorphone (Dilaudid) injection 0.2 mg, 0.2 mg, intravenous, q5 min PRN, Neptali Ross MD    HYDROmorphone (Dilaudid) injection 0.5 mg, 0.5 mg, intravenous, q5 min PRN, Neptali Ross MD, 0.5 mg at 08/08/24 1713    labetaloL (Normodyne,Trandate) injection 5 mg, 5 mg, intravenous, Once PRN, Neptali Ross MD    lactated Ringer's infusion, 100 mL/hr, intravenous, Continuous, Neptali Ross MD, Last Rate: 100 mL/hr at 08/08/24 1700, 100 mL/hr at 08/08/24 1700    lactated Ringer's infusion, 100 mL/hr, intravenous, Continuous, Npetali Ross MD, Last Rate: 100 mL/hr at 08/08/24 1515, 100 mL/hr at 08/08/24 1515    lidocaine PF (Xylocaine) 10 mg/mL (1 %) injection 1 mg, 0.1 mL, subcutaneous, Once, Neptali Ross MD    ondansetron (Zofran) injection 4 mg, 4 mg, intravenous, Once PRN, Neptali Ross MD    oxyCODONE (Roxicodone) immediate release tablet 5 mg, 5 mg, oral, q4h PRN, Neptali Ross MD    oxygen (O2) therapy, , inhalation, Continuous PRN - O2/gases, Neptali Ross MD    promethazine (Phenergan) 6.25 mg in sodium chloride 0.9% 50 mL IV, 6.25 mg, intravenous, Once PRN, Neptali Ross MD    Labs  CBC  WBC   Date Value Ref Range Status   08/08/2024 9.1 4.4 - 11.3 x10*3/uL Final     nRBC   Date Value Ref Range Status   08/08/2024 0.0 0.0 - 0.0 /100 WBCs Final     RBC   Date Value Ref Range Status   08/08/2024 3.36 (L) 4.00 - 5.20 x10*6/uL Final     Hemoglobin   Date Value Ref Range Status "   08/08/2024 10.1 (L) 12.0 - 16.0 g/dL Final     Hematocrit   Date Value Ref Range Status   08/08/2024 30.5 (L) 36.0 - 46.0 % Final     MCV   Date Value Ref Range Status   08/08/2024 91 80 - 100 fL Final     MCH   Date Value Ref Range Status   08/08/2024 30.1 26.0 - 34.0 pg Final     MCHC   Date Value Ref Range Status   08/08/2024 33.1 32.0 - 36.0 g/dL Final     RDW   Date Value Ref Range Status   08/08/2024 12.6 11.5 - 14.5 % Final     Platelets   Date Value Ref Range Status   08/08/2024 525 (H) 150 - 450 x10*3/uL Final       CMP  Glucose   Date Value Ref Range Status   08/08/2024 99 74 - 99 mg/dL Final     Sodium   Date Value Ref Range Status   08/08/2024 136 136 - 145 mmol/L Final     Potassium   Date Value Ref Range Status   08/08/2024 3.7 3.5 - 5.3 mmol/L Final     Chloride   Date Value Ref Range Status   08/08/2024 106 98 - 107 mmol/L Final     Bicarbonate   Date Value Ref Range Status   08/08/2024 19 (L) 21 - 32 mmol/L Final     Anion Gap   Date Value Ref Range Status   08/08/2024 15 10 - 20 mmol/L Final     Urea Nitrogen   Date Value Ref Range Status   08/08/2024 11 6 - 23 mg/dL Final     Creatinine   Date Value Ref Range Status   08/08/2024 0.77 0.50 - 1.05 mg/dL Final     eGFR   Date Value Ref Range Status   08/08/2024 >90 >60 mL/min/1.73m*2 Final     Comment:     Calculations of estimated GFR are performed using the 2021 CKD-EPI Study Refit equation without the race variable for the IDMS-Traceable creatinine methods.  https://jasn.asnjournals.org/content/early/2021/09/22/ASN.0905632475     Calcium   Date Value Ref Range Status   08/08/2024 8.5 (L) 8.6 - 10.3 mg/dL Final     Albumin   Date Value Ref Range Status   08/08/2024 4.0 3.4 - 5.0 g/dL Final     Alkaline Phosphatase   Date Value Ref Range Status   08/08/2024 39 33 - 110 U/L Final     Total Protein   Date Value Ref Range Status   08/08/2024 7.4 6.4 - 8.2 g/dL Final     AST   Date Value Ref Range Status   08/08/2024 12 9 - 39 U/L Final     Bilirubin,  Total   Date Value Ref Range Status   08/08/2024 0.4 0.0 - 1.2 mg/dL Final     ALT   Date Value Ref Range Status   08/08/2024 14 7 - 45 U/L Final     Comment:     Patients treated with Sulfasalazine may generate falsely decreased results for ALT.       Coagulation   Protime   Date Value Ref Range Status   08/08/2024 11.9 9.8 - 12.8 seconds Final     INR   Date Value Ref Range Status   08/08/2024 1.1 0.9 - 1.1 Final     aPTT   Date Value Ref Range Status   08/08/2024 26 (L) 27 - 38 seconds Final       Pregnancy Tests  HCG, Beta-Quantitative   Date Value Ref Range Status   08/08/2024 5,520 (H) <5 mIU/mL Final     Comment:     Low-level positive HCG results can be seen in early pregnancy, in joao- or post-menopausal females due to normal pituitary HCG production, or with analytic interference. Repeat testing in 48-72 hours can aid in assessing for pregnancy as results should double in this time period. FSH measurement is recommended in joao- or post-menopausal females as concurrent elevation of FSH can support pituitary production as the source of the HCG elevation.          Imaging   No results found for this or any previous visit.    Ultrasound reviewed personally     Assessment/Plan   Principal Problem:    Left tubal pregnancy with intrauterine pregnancy ([facility])      Patient consented for laparoscopic left salpingectomy       I spent 30 minutes in the professional and overall care of this patient.      Melvin Sandoval MD

## 2024-08-08 NOTE — OP NOTE
Left Surgical Intervention Laparoscopy Ectopic Pregnancy (L) Operative Note     Date: 2024  OR Location: [facility] OR    Name: Idania Andersen, : 1990, Age: 34 y.o., MRN: [id number], Sex: female    Diagnosis  Pre-op Diagnosis      * Left tubal pregnancy with intrauterine pregnancy (HHS-HCC) [O00.112] Post-op Diagnosis     * Left tubal pregnancy with intrauterine pregnancy (HHS-HCC) [O00.112]     Procedures  Left Surgical Intervention Laparoscopy Ectopic Pregnancy  04673 - VA LAPS TX ECTOPIC PREG W/SALPING&/OOPHORECTOMY      Surgeons      * Melvin Sandoval - Primary    Resident/Fellow/Other Assistant:  Surgeons and Role:  * No surgeons found with a matching role *    Procedure Summary  Anesthesia: General  ASA: II  Anesthesia Staff: Anesthesiologist: Neptali Ross MD  C-AA: KEHINDE Mir  Estimated Blood Loss: 5 mL  Intra-op Medications:   Administrations occurring from 1530 to 1655 on 24:   Medication Name Total Dose   BUPivacaine HCl (Marcaine) 0.5 % (5 mg/mL) injection 14 mL   sodium chloride 0.9 % irrigation solution 1,000 mL              Anesthesia Record               Intraprocedure I/O Totals          Output    Urine 50 mL    Est. Blood Loss 5 mL    Total Output 55 mL          Specimen:   ID Type Source Tests Collected by Time   1 : LEFT FALLOPIAN TUBE ECTOPIC Tissue ECTOPIC PRODUCTS OF CONCEPTION (SPECIFY LOCATION) SURGICAL PATHOLOGY EXAM Melvin Sandoval MD 2024 1511        Staff:   Circulator: Usman  Scrub Person: Maribell  Scrub Person: Gentry         Drains and/or Catheters: * None in log *    Tourniquet Times:         Implants:     Findings: Left ruptured ectopic pregnancy, adhesions to sigmoid colon and omentum    Indications: Idania Andersen is an 34 y.o. female who is having surgery for Left tubal pregnancy with intrauterine pregnancy (HHS-HCC) [O00.112].     The patient was seen in the preoperative area. The risks, benefits, complications, treatment options, non-operative  alternatives, expected recovery and outcomes were discussed with the patient. The possibilities of reaction to medication, pulmonary aspiration, injury to surrounding structures, bleeding, recurrent infection, the need for additional procedures, failure to diagnose a condition, and creating a complication requiring transfusion or operation were discussed with the patient. The patient concurred with the proposed plan, giving informed consent.  The site of surgery was properly noted/marked if necessary per policy. The patient has been actively warmed in preoperative area. Preoperative antibiotics are not indicated. Venous thrombosis prophylaxis have been ordered including bilateral sequential compression devices    Procedure Details:     Patient with ruptured left ectopic pregnancy failed methotrexate x 2.  Taken to the operating room she was given general endotracheal anesthesia placed into the dorsolithotomy position prepped and draped in usual fashion.  A Tam catheter placed into the bladder and OG tube in the stomach.  [place] speculum placed into the vagina and a [place] tenaculum placed into the uterus and attached to the anterior lip of the cervix.  Attention then turned to the abdomen where Marcaine was injected and a transverse 10 mm incision was made just inferior to the umbilicus through which a Veress needle was passed into the abdominal cavity.  After confirmation of intra-abdominal position 3 L of CO2 gas were insufflated Veress needle was withdrawn and a 10 mm trocar and sheath inserted in the abdominal cavity.  Laparoscope was inserted.  The right upper quadrant appeared normal the omentum was adherent over the pelvis.  Was able to be peeled off with light traction.  A 5 mm trocar and sheath inserted 2 cm superior to the pubic bone under the direct vision of the laparoscope as well as in the left flank.  The remainder of the sigmoid colon and omentum were peeled off of the pelvis.  The uterus was  normal size shape and contour.  The right fallopian tube and ovary appeared normal.  The left fallopian tube and adnexa were adherent to the left side of the sigmoid colon where able to peel this off with light traction.  A ruptured ectopic pregnancy of the left fallopian tube was identified and coagulated and divided among the mesosalpinx.  Placed in an Endo Catch bag and removed.  Pelvis was thoroughly irrigated suctioned dry hemostasis confirmed.  Surgicel powder placed over the left adnexa.  Instrument drawn from the abdomen pneumoperitoneum was allowed to escape.  Some umbilical fascia closed with a figure-of-eight 0 Vicryl suture.  Skin was closed with 4-0 Monocryl in a subcuticular fashion and Steri-Strips applied.  Patient taken out of the dorsolithotomy position after instrument drawn from the vagina transferred to recovey room in stable condition    Complications:  None; patient tolerated the procedure well.    Disposition: PACU - hemodynamically stable.  Condition: stable         Additional Details:     Attending Attestation: A qualified resident physician was not available.    Melvin Sandoval  Phone Number: [phone]

## 2024-08-08 NOTE — ANESTHESIA PROCEDURE NOTES
Airway  Date/Time: 8/8/2024 3:41 PM  Urgency: elective    Airway not difficult    Staffing  Performed: KEHINDE   Authorized by: Neptali Ross MD    Performed by: KEHINDE Mir  Patient location during procedure: OR    Indications and Patient Condition  Indications for airway management: anesthesia and airway protection  Spontaneous Ventilation: absent  Sedation level: deep  Preoxygenated: yes  Patient position: sniffing  Mask difficulty assessment: 1 - vent by mask    Final Airway Details  Final airway type: endotracheal airway      Successful airway: ETT     Successful intubation technique: direct laryngoscopy  Facilitating devices/methods: intubating stylet and cricoid pressure  Endotracheal tube insertion site: oral  Blade: Jenn  Blade size: #3  ETT size (mm): 7.0  Cormack-Lehane Classification: grade I - full view of glottis  Placement verified by: chest auscultation and capnometry   Measured from: lips  ETT to lips (cm): 21  Number of attempts at approach: 1

## 2024-08-08 NOTE — PROGRESS NOTES
08/08/24 1236   [facility] Disability Status   Are you deaf or do you have serious difficulty hearing? N   Are you blind or do you have serious difficulty seeing, even when wearing glasses? N   Because of a physical, mental, or emotional condition, do you have serious difficulty concentrating, remembering, or making decisions? (5 years old or older) N   Do you have serious difficulty walking or climbing stairs? N   Do you have serious difficulty dressing or bathing? N   Because of a physical, mental, or emotional condition, do you have serious difficulty doing errands alone such as visiting the doctor? N

## 2024-08-08 NOTE — ED TRIAGE NOTES
"Patient stated she started to feel severe pain in her lower left flank for two days., the patient stated she has not had a bowel movement for approximately a week. She also stated she had recently been diagnosed with an ectopic pregnancy and given medicine to \"dissolve\" it.   "

## 2024-08-08 NOTE — ANESTHESIA PREPROCEDURE EVALUATION
Patient: Idania Andersen    Evaluation Method: In-person visit    Procedure Information       Date/Time: 08/08/24 1600    Procedure: Left Surgical Intervention Laparoscopy Ectopic Pregnancy (Left)    Location: [facility] OR 01 / Virtual [facility] OR    Surgeons: Melvin Sandoval MD            Relevant Problems   GYN   (+) Left tubal pregnancy with intrauterine pregnancy (HHS-HCC)       Clinical information reviewed:    Allergies                NPO Detail:  No data recorded     OB/GYN     Physical Exam    Airway  Mallampati: II  TM distance: >3 FB  Neck ROM: full     Cardiovascular   Rhythm: regular  Rate: normal     Dental    Pulmonary   Breath sounds clear to auscultation     Abdominal            Anesthesia Plan    History of general anesthesia?: yes  History of complications of general anesthesia?: no    ASA 2     general     intravenous induction   Anesthetic plan and risks discussed with patient.    Plan discussed with CRNA.

## 2024-08-09 ENCOUNTER — TELEPHONE (OUTPATIENT)
Dept: OBSTETRICS AND GYNECOLOGY | Facility: CLINIC | Age: 34
End: 2024-08-09
Payer: COMMERCIAL

## 2024-08-11 NOTE — ED PROCEDURE NOTE
Procedure  Critical Care    Performed by: Aiden Alfonso MD  Authorized by: Aiden Alfonso MD    Critical care provider statement:     Critical care time (minutes):  35    Critical care time was exclusive of:  Separately billable procedures and treating other patients and teaching time    Critical care was necessary to treat or prevent imminent or life-threatening deterioration of the following conditions: ectopic pregnancy.    Critical care was time spent personally by me on the following activities:  Blood draw for specimens, discussions with consultants, evaluation of patient's response to treatment, development of treatment plan with patient or surrogate, examination of patient, ordering and performing treatments and interventions, ordering and review of laboratory studies, pulse oximetry, ordering and review of radiographic studies, re-evaluation of patient's condition and review of old charts    Care discussed with: admitting provider                 Aiden Alfonso MD  08/10/24 6900

## 2024-08-13 LAB
LABORATORY COMMENT REPORT: NORMAL
PATH REPORT.FINAL DX SPEC: NORMAL
PATH REPORT.GROSS SPEC: NORMAL
PATH REPORT.RELEVANT HX SPEC: NORMAL
PATH REPORT.TOTAL CANCER: NORMAL

## 2024-08-14 NOTE — TELEPHONE ENCOUNTER
RN contacted [employer] stating paperwork be reent as soon as possible as office has not received requested forms  RN confirmed office fax number  [employer] is refaxing forms as a high priority request  Odalys Stevens RN

## 2024-08-14 NOTE — TELEPHONE ENCOUNTER
RN returned Patient call, verified by name and   Paperwork needed for [employer] for Short Term Disability  Forms not received to office  Patient provided information below  Phone [phone]  Claim Number [id number]  Plan Number [id number]  RN will call to resolve issue  Odalys Stevens RN

## 2024-08-15 ENCOUNTER — DOCUMENTATION (OUTPATIENT)
Dept: OBSTETRICS AND GYNECOLOGY | Facility: CLINIC | Age: 34
End: 2024-08-15
Payer: COMMERCIAL

## 2024-08-15 NOTE — PROGRESS NOTES
Short Term Disability Paperwork completed and faxed  Documentation will be uploaded to Alessandro Stevens RN

## 2024-08-30 ENCOUNTER — TELEPHONE (OUTPATIENT)
Dept: OBSTETRICS AND GYNECOLOGY | Facility: CLINIC | Age: 34
End: 2024-08-30

## 2024-08-30 ENCOUNTER — OFFICE VISIT (OUTPATIENT)
Dept: OBSTETRICS AND GYNECOLOGY | Facility: CLINIC | Age: 34
End: 2024-08-30
Payer: COMMERCIAL

## 2024-08-30 ENCOUNTER — DOCUMENTATION (OUTPATIENT)
Dept: OBSTETRICS AND GYNECOLOGY | Facility: CLINIC | Age: 34
End: 2024-08-30

## 2024-08-30 VITALS
DIASTOLIC BLOOD PRESSURE: 70 MMHG | BODY MASS INDEX: 31.28 KG/M2 | HEIGHT: 64 IN | WEIGHT: 183.2 LBS | SYSTOLIC BLOOD PRESSURE: 122 MMHG

## 2024-08-30 DIAGNOSIS — Z48.89 ENCOUNTER FOR POST SURGICAL WOUND CHECK: ICD-10-CM

## 2024-08-30 PROCEDURE — 1036F TOBACCO NON-USER: CPT | Performed by: OBSTETRICS & GYNECOLOGY

## 2024-08-30 PROCEDURE — 99211 OFF/OP EST MAY X REQ PHY/QHP: CPT | Performed by: OBSTETRICS & GYNECOLOGY

## 2024-08-30 PROCEDURE — 3008F BODY MASS INDEX DOCD: CPT | Performed by: OBSTETRICS & GYNECOLOGY

## 2024-08-30 SDOH — HEALTH STABILITY: PHYSICAL HEALTH: ON AVERAGE, HOW MANY DAYS PER WEEK DO YOU ENGAGE IN MODERATE TO STRENUOUS EXERCISE (LIKE A BRISK WALK)?: 3 DAYS

## 2024-08-30 SDOH — HEALTH STABILITY: PHYSICAL HEALTH: ON AVERAGE, HOW MANY MINUTES DO YOU ENGAGE IN EXERCISE AT THIS LEVEL?: 20 MIN

## 2024-08-30 ASSESSMENT — ENCOUNTER SYMPTOMS
NEUROLOGICAL NEGATIVE: 0
PSYCHIATRIC NEGATIVE: 0
HEMATOLOGIC/LYMPHATIC NEGATIVE: 0
CONSTITUTIONAL NEGATIVE: 0
ENDOCRINE NEGATIVE: 0
RESPIRATORY NEGATIVE: 0
MUSCULOSKELETAL NEGATIVE: 0
ALLERGIC/IMMUNOLOGIC NEGATIVE: 0
CARDIOVASCULAR NEGATIVE: 0
GASTROINTESTINAL NEGATIVE: 0
EYES NEGATIVE: 0

## 2024-08-30 ASSESSMENT — SOCIAL DETERMINANTS OF HEALTH (SDOH)
DO YOU BELONG TO ANY CLUBS OR ORGANIZATIONS SUCH AS CHURCH GROUPS UNIONS, FRATERNAL OR ATHLETIC GROUPS, OR SCHOOL GROUPS?: NO
HOW OFTEN DO YOU ATTENT MEETINGS OF THE CLUB OR ORGANIZATION YOU BELONG TO?: NEVER
HOW OFTEN DO YOU GET TOGETHER WITH FRIENDS OR RELATIVES?: NEVER
ARE YOU MARRIED, WIDOWED, DIVORCED, SEPARATED, NEVER MARRIED, OR LIVING WITH A PARTNER?: NEVER MARRIED
IN A TYPICAL WEEK, HOW MANY TIMES DO YOU TALK ON THE PHONE WITH FAMILY, FRIENDS, OR NEIGHBORS?: THREE TIMES A WEEK
HOW OFTEN DO YOU ATTEND CHURCH OR RELIGIOUS SERVICES?: MORE THAN 4 TIMES PER YEAR

## 2024-08-30 ASSESSMENT — PAIN SCALES - GENERAL: PAINLEVEL: 0-NO PAIN

## 2024-08-30 NOTE — PROGRESS NOTES
Idania Andersen ( 1990) had an initial visit on 2024 for her annual exam and initial OB visit.  Patient was seen in the Emergency Department 2024 - 2024 estimated at 5wk gestational age for vaginal bleeding and left sided abdominal pain in the lower quadrant.  Ms. Andersen was admitted to the hospital and I was consulted for her care.    Patient had an ultrasound performed which did not show any obvious Intrauterine Pregnancy, treatment for an ectopic pregnancy was discussed at that time. Patient was stable from a hemodynamic perspective with a benign abdominal exam.  Using shared decision making plan was to trend beta-hCG levels and repeat ultrasound on 2024.  Strict return precautions were reviewed with the patient.    Ms. Andersen was seen back in the Emergency Department 2024 with concern for left-sided ectopic pregnancy.  Ultrasound finding of left adnexal ectopic pregnancy showing 9 mm gestational sac with yolk sac.  Patient was counseled about this finding and need for treatment.  Options for treatment include surgical excision versus conservative therapy with methotrexate.  The risks and benefits of both were reviewed.  Patient chose methotrexate therapy.    Treatment Plan includes additional time to continue to monitor and trend the beta-hCG and she may require second dose of methotrexate or need surgery for definitive management.  Patient was provided with strict return precautions to the emergency department, in addition to being provided with instructions on pain management.    I contacted Patient on 2024 to review hCG trend.  On methotrexate day 4 hCG level was in the high 10,000's and now on day 8 hCG level is in the low 11,000's.  This overall represents a plateau.  Counseled patient on this finding and that management options include giving an additional dose of methotrexate or surgery.  At this time patient prefers management with repeat methotrexate.  Second  dose of methotrexate was administered on 08/02/2024.    Patient was seen in the Emergency Room again on 08/08/2024 for worsening left lower quadrant pain x 2 days.  Ultrasound showed  ectopic pregnancy in the left ovary with some surrounding blood.  Recommendation at this time was surgical management; Patient was in agreement.    Laparoscopic left salpingectomy for ectopic pregnancy was performed on 08/08/2024.    Due to Ms. Andersen's ongoing complications related to her ectopic pregnancy, Hospital Admission and multiple Emergency Department visits and medication management, she was physically unable to attend work or perform any of her job functions requiring a continuous leave from 07/24/2024-08/08/2024.     From 08/09/2024 - 09/04/2024 Ms. Andersen was advised to remain off work, unable to work with restrictions, due to healing from laparoscopic excision of left tubal ectopic pregnancy; during surgery it was documented that she suffered a ruptured ectopic pregnancy of the left fallopian tube.  Patient was unable to work during this time frame due to lifting restrictions for prevention of a potential surgical wound dehiscence, prevention of infection and to heal physically and mentally from her surgical procedure.  Patient was also experiencing continued side effects from methotrexate therapy.    Ms. Andersen had a post operative visit on 08/30/2024 and it was determined that she is healing appropriately and can Return To Work Full Duty 09/04/2024.    Continuous leave was recommended from 07/24/2024 - 09/04/2024 due to reasons previously outlined.

## 2024-08-30 NOTE — TELEPHONE ENCOUNTER
Patient called into office, verified by name and   Patient has [employer] on a conference call  STD was filled out in its entirety and faxed  [place] is now requesting medical documentation stating for condition and why surgical procedure was necessary.  Also needing MD documentation on dates that Patient had to be off work, reason for absence and restrictions while on leave.    Documentation can be sent to [email or web address]  Copy Patient's email address on all communication  Include Case Number    RN notified Physicians  Odalys Stevens RN

## 2024-08-30 NOTE — PROGRESS NOTES
Subjective   Patient ID: Idania Andersen is a 34 y.o. female who presents for Post-op (Etopic Incision Check. Last pap 22 wnl. HPV Neg. ).  ANDREAS Emerson is a 34-year-old female  6 para 2 well-known to the practice here for postoperative visit after left salpingectomy for ectopic pregnancy.    She feels well.  Tolerating oral intake.  Ambulating.  No vaginal bleeding.    Desires no birth control at this time.    Pictures were reviewed with the patient  Review of Systems    Objective   Physical Exam  Abdomen is soft nondistended bowel sounds positive no masses palpated.  Incisions are clean dry and intact  Pelvic exam deferred    Assessment/Plan   Normal postoperative exam after laparoscopic left salpingectomy for ruptured ectopic pregnancy.  She previously received 2 injections of methotrexate.    Discussed contraception and she chooses abstinence at this time.  Is considering ParaGard IUD and I have told her she can call anytime to set up placement.    Follow-up in September for annual exam         Melvin Sandoval MD 24 12:35 PM

## 2024-09-27 ENCOUNTER — APPOINTMENT (OUTPATIENT)
Dept: OBSTETRICS AND GYNECOLOGY | Facility: CLINIC | Age: 34
End: 2024-09-27
Payer: COMMERCIAL

## 2024-10-25 ENCOUNTER — APPOINTMENT (OUTPATIENT)
Dept: OBSTETRICS AND GYNECOLOGY | Facility: CLINIC | Age: 34
End: 2024-10-25
Payer: COMMERCIAL

## 2024-10-25 VITALS — SYSTOLIC BLOOD PRESSURE: 121 MMHG | DIASTOLIC BLOOD PRESSURE: 69 MMHG | WEIGHT: 187.6 LBS | BODY MASS INDEX: 32.2 KG/M2

## 2024-10-25 DIAGNOSIS — Z01.419 ENCOUNTER FOR WELL WOMAN EXAM WITH ROUTINE GYNECOLOGICAL EXAM: Primary | ICD-10-CM

## 2024-10-25 DIAGNOSIS — F32.81 PMDD (PREMENSTRUAL DYSPHORIC DISORDER): ICD-10-CM

## 2024-10-25 LAB — PREGNANCY TEST URINE, POC: NEGATIVE

## 2024-10-25 PROCEDURE — 1036F TOBACCO NON-USER: CPT | Performed by: STUDENT IN AN ORGANIZED HEALTH CARE EDUCATION/TRAINING PROGRAM

## 2024-10-25 PROCEDURE — 99395 PREV VISIT EST AGE 18-39: CPT | Performed by: STUDENT IN AN ORGANIZED HEALTH CARE EDUCATION/TRAINING PROGRAM

## 2024-10-25 PROCEDURE — 81025 URINE PREGNANCY TEST: CPT | Performed by: STUDENT IN AN ORGANIZED HEALTH CARE EDUCATION/TRAINING PROGRAM

## 2024-10-25 RX ORDER — CITALOPRAM 10 MG/1
TABLET ORAL
Qty: 30 TABLET | Refills: 11 | Status: SHIPPED | OUTPATIENT
Start: 2024-10-25

## 2024-10-25 ASSESSMENT — PATIENT HEALTH QUESTIONNAIRE - PHQ9
1. LITTLE INTEREST OR PLEASURE IN DOING THINGS: NOT AT ALL
SUM OF ALL RESPONSES TO PHQ9 QUESTIONS 1 AND 2: 0
2. FEELING DOWN, DEPRESSED OR HOPELESS: NOT AT ALL

## 2024-10-25 ASSESSMENT — ENCOUNTER SYMPTOMS
GASTROINTESTINAL NEGATIVE: 0
MUSCULOSKELETAL NEGATIVE: 0
CONSTITUTIONAL NEGATIVE: 0
PSYCHIATRIC NEGATIVE: 0
ALLERGIC/IMMUNOLOGIC NEGATIVE: 0
RESPIRATORY NEGATIVE: 0
CARDIOVASCULAR NEGATIVE: 0
NEUROLOGICAL NEGATIVE: 0
ENDOCRINE NEGATIVE: 0
EYES NEGATIVE: 0
HEMATOLOGIC/LYMPHATIC NEGATIVE: 0

## 2024-10-25 ASSESSMENT — PAIN SCALES - GENERAL: PAINLEVEL_OUTOF10: 0-NO PAIN

## 2024-10-25 NOTE — PROGRESS NOTES
Subjective   Patient ID: Idania Andersen is a 34 y.o. female who presents for Annual Exam (Last pap 9/29/22 wnl. HPV Neg.).  Patient here for annual visit.  No breast, bowel, bleeding, bladder, or sexual complaints.  Planning contraception.  Not currently sexually active.  Is focused on school and finishing her masters degree.  Does report nausea, irritability and hot flashes during the second half of her cycle prior to menses.        Review of Systems   All other systems reviewed and are negative.      Objective   Physical Exam  Vitals reviewed. Exam conducted with a chaperone present.   Constitutional:       General: She is not in acute distress.     Appearance: Normal appearance. She is not ill-appearing.   Pulmonary:      Effort: Pulmonary effort is normal.   Chest:   Breasts:     Breasts are symmetrical.      Right: Normal. No swelling, bleeding, inverted nipple, mass, nipple discharge, skin change or tenderness.      Left: Normal. No swelling, bleeding, inverted nipple, mass, nipple discharge, skin change or tenderness.   Abdominal:      General: There is no distension.      Palpations: Abdomen is soft. There is no mass.      Tenderness: There is no abdominal tenderness. There is no guarding or rebound.      Hernia: There is no hernia in the left inguinal area or right inguinal area.          Comments: Well-healed laparoscopic incisions x 3   Genitourinary:     General: Normal vulva.      Exam position: Lithotomy position.      Labia:         Right: No rash, tenderness, lesion or injury.         Left: No rash, tenderness, lesion or injury.       Urethra: No prolapse, urethral swelling or urethral lesion.      Vagina: No vaginal discharge, erythema, tenderness, bleeding, lesions or prolapsed vaginal walls.      Cervix: No cervical motion tenderness, discharge, friability, lesion, erythema or cervical bleeding.      Uterus: Not deviated, not enlarged, not fixed, not tender and no uterine prolapse.       Adnexa:          Right: No mass, tenderness or fullness.          Left: No mass, tenderness or fullness.     Musculoskeletal:      Right lower leg: No edema.      Left lower leg: No edema.   Lymphadenopathy:      Upper Body:      Right upper body: No supraclavicular, axillary or pectoral adenopathy.      Left upper body: No supraclavicular, axillary or pectoral adenopathy.      Lower Body: No right inguinal adenopathy. No left inguinal adenopathy.   Neurological:      Mental Status: She is alert.         Assessment/Plan   Diagnoses and all orders for this visit:  Encounter for well woman exam with routine gynecological exam  -     POCT pregnancy, urine manually resulted  PMDD (premenstrual dysphoric disorder)  -     citalopram (CeleXA) 10 mg tablet; 1 tablet daily  starting at day 14 of menstrual cycle or with onset of premenstrual dysphoric symptoms    Patient here for annual visit.  Clinical breast and pelvic exams within normal limits.  Patient does report luteal phase symptoms consistent with PMDD.  Offered patient treatment with OCPs versus daily SSRI versus luteal/as needed SSRIs.  Patient prefers luteal/PRN.  Prescription sent.  Will patient follow-up in 3 months.  Plans contraception at this time.       Neptali Covington MD 10/25/24 2:01 PM

## 2025-01-28 ENCOUNTER — APPOINTMENT (OUTPATIENT)
Dept: OBSTETRICS AND GYNECOLOGY | Facility: CLINIC | Age: 35
End: 2025-01-28
Payer: COMMERCIAL

## 2025-01-28 VITALS — DIASTOLIC BLOOD PRESSURE: 89 MMHG | WEIGHT: 188 LBS | SYSTOLIC BLOOD PRESSURE: 112 MMHG | BODY MASS INDEX: 32.27 KG/M2

## 2025-01-28 DIAGNOSIS — F32.81 PMDD (PREMENSTRUAL DYSPHORIC DISORDER): Primary | ICD-10-CM

## 2025-01-28 PROCEDURE — 99213 OFFICE O/P EST LOW 20 MIN: CPT | Performed by: STUDENT IN AN ORGANIZED HEALTH CARE EDUCATION/TRAINING PROGRAM

## 2025-01-28 ASSESSMENT — ENCOUNTER SYMPTOMS
CARDIOVASCULAR NEGATIVE: 0
EYES NEGATIVE: 0
CONSTITUTIONAL NEGATIVE: 0
MUSCULOSKELETAL NEGATIVE: 0
HEMATOLOGIC/LYMPHATIC NEGATIVE: 0
GASTROINTESTINAL NEGATIVE: 0
ALLERGIC/IMMUNOLOGIC NEGATIVE: 0
RESPIRATORY NEGATIVE: 0
ENDOCRINE NEGATIVE: 0
NEUROLOGICAL NEGATIVE: 0
PSYCHIATRIC NEGATIVE: 0

## 2025-01-28 NOTE — PROGRESS NOTES
Subjective   Patient ID: Idania Andersen is a 34 y.o. female who presents for Follow-up (PMDD).  Patient here for PMDD follow-up.  Patient has not tried Celexa which was prescribed at last visit.  Patient is not interested in medical management at this time and would prefer on behavioral modifications and stress management.  Of particular interest is improving the quality of her sleep.        Review of Systems   All other systems reviewed and are negative.      Objective   Physical Exam  Vitals reviewed.   Constitutional:       General: She is not in acute distress.     Appearance: She is not ill-appearing.   Pulmonary:      Effort: Pulmonary effort is normal.   Skin:     Coloration: Skin is not pale.   Neurological:      Mental Status: She is alert.         Assessment/Plan   Diagnoses and all orders for this visit:  PMDD (premenstrual dysphoric disorder)    Patient here for PMDD follow-up.  Patient is no longer interested in medical management of her PMDD.  Discussed with patient in detail lifestyle modifications could be used to improve her symptoms in particular improving sleep hygiene and intentional self-care.  Will have patient follow-up as needed       Neptali Covington MD 01/28/25 5:34 PM

## (undated) DEVICE — RETRIEVAL SYSTEM, MONARCH, 10MM DISP ENDOSCOPIC

## (undated) DEVICE — SOLUTION, IRRIGATION, SODIUM CHLORIDE 0.9%, 1000 ML, POUR BOTTLE

## (undated) DEVICE — SUTURE, VICRYL, 0, 27 IN, UR-6, VIOLET

## (undated) DEVICE — ACCESS SYS, KII SHIELDED BLADED, Z-THREAD, 12X100CM

## (undated) DEVICE — NEEDLE, INSUFFLATION, 13GAX120MM, DISP

## (undated) DEVICE — PUMP, STRYKERFLOW 2 & HANDPIECE W/10FT. IRRIGATION TUBING

## (undated) DEVICE — TUBE SET, PNEUMOLAR HEATED, SMOKE EVACU, HIGH-FLOW

## (undated) DEVICE — ACCESS SYS, KII SHIELDED BLADED, Z-THREAD, 5X100CM

## (undated) DEVICE — SYRINGE, LUER LOCK, 12ML

## (undated) DEVICE — SOLUTION, SCRUB EXIDINE, 4% CHG, 4 OZ

## (undated) DEVICE — Device

## (undated) DEVICE — PREP TRAY, VAGINAL

## (undated) DEVICE — SLEEVE, KII, Z-THREAD, 5X100CM

## (undated) DEVICE — LIGASURE, V SEALER/DIVIDER  5MM BLUNT TIP

## (undated) DEVICE — SCOPE WARMER, LAPAROSCOPE, BAG ONLY, LF

## (undated) DEVICE — GLOVE, SURGICAL, PROTEXIS PI MICRO, 7.5, PF, LF

## (undated) DEVICE — DRAPE PACK, LAVH, W/ATTACHED LEGGINGS, W/POUCH, 100 X 114 IN, LF, STERILE

## (undated) DEVICE — SUTURE, VICRYL, 4-0, 18 IN, PS2, UNDYED

## (undated) DEVICE — APPLICATOR, ENDOSCOPIC, F/SURGICEL POWDER